# Patient Record
Sex: FEMALE | Race: WHITE | NOT HISPANIC OR LATINO | Employment: UNEMPLOYED | URBAN - METROPOLITAN AREA
[De-identification: names, ages, dates, MRNs, and addresses within clinical notes are randomized per-mention and may not be internally consistent; named-entity substitution may affect disease eponyms.]

---

## 2017-02-16 ENCOUNTER — LAB VISIT (OUTPATIENT)
Dept: LAB | Facility: OTHER | Age: 32
End: 2017-02-16
Attending: OBSTETRICS & GYNECOLOGY
Payer: MEDICAID

## 2017-02-16 ENCOUNTER — OFFICE VISIT (OUTPATIENT)
Dept: OBSTETRICS AND GYNECOLOGY | Facility: CLINIC | Age: 32
End: 2017-02-16
Attending: OBSTETRICS & GYNECOLOGY
Payer: MEDICAID

## 2017-02-16 VITALS
DIASTOLIC BLOOD PRESSURE: 74 MMHG | BODY MASS INDEX: 26.76 KG/M2 | WEIGHT: 141.75 LBS | HEIGHT: 61 IN | SYSTOLIC BLOOD PRESSURE: 112 MMHG

## 2017-02-16 DIAGNOSIS — Z20.2 POSSIBLE EXPOSURE TO STD: ICD-10-CM

## 2017-02-16 DIAGNOSIS — Z00.00 ANNUAL PHYSICAL EXAM: Primary | ICD-10-CM

## 2017-02-16 LAB
CANDIDA RRNA VAG QL PROBE: NEGATIVE
G VAGINALIS RRNA GENITAL QL PROBE: NEGATIVE
HAV IGM SERPL QL IA: NEGATIVE
HBV CORE IGM SERPL QL IA: NEGATIVE
HBV SURFACE AG SERPL QL IA: NEGATIVE
HCV AB SERPL QL IA: NEGATIVE
HIV 1+2 AB+HIV1 P24 AG SERPL QL IA: NEGATIVE
T VAGINALIS RRNA GENITAL QL PROBE: NEGATIVE

## 2017-02-16 PROCEDURE — 88175 CYTOPATH C/V AUTO FLUID REDO: CPT

## 2017-02-16 PROCEDURE — 99999 PR PBB SHADOW E&M-EST. PATIENT-LVL III: CPT | Mod: PBBFAC,,, | Performed by: OBSTETRICS & GYNECOLOGY

## 2017-02-16 PROCEDURE — 86592 SYPHILIS TEST NON-TREP QUAL: CPT

## 2017-02-16 PROCEDURE — 80074 ACUTE HEPATITIS PANEL: CPT

## 2017-02-16 PROCEDURE — 86703 HIV-1/HIV-2 1 RESULT ANTBDY: CPT

## 2017-02-16 PROCEDURE — 36415 COLL VENOUS BLD VENIPUNCTURE: CPT

## 2017-02-16 PROCEDURE — 99385 PREV VISIT NEW AGE 18-39: CPT | Mod: S$PBB,,, | Performed by: OBSTETRICS & GYNECOLOGY

## 2017-02-16 NOTE — PROGRESS NOTES
"CC: Well woman exam    Savannah Tijerina is a 31 y.o. female  s/p BTL presents for well woman exam.  LMP: Patient's last menstrual period was 2017..  No issues, problems, or complaints.  Last pap .  Hx abnormal pap in the past s/p colpo.    Past Medical History   Diagnosis Date    Abnormal Pap smear of cervix      Past Surgical History   Procedure Laterality Date    Tubal ligation      Appendectomy       Social History     Social History    Marital status: Single     Spouse name: N/A    Number of children: N/A    Years of education: N/A     Occupational History    Not on file.     Social History Main Topics    Smoking status: Never Smoker    Smokeless tobacco: Not on file    Alcohol use No    Drug use: No    Sexual activity: Yes     Partners: Male     Birth control/ protection: None     Other Topics Concern    Not on file     Social History Narrative    No narrative on file     Family History   Problem Relation Age of Onset    Breast cancer Neg Hx     Colon cancer Neg Hx     Ovarian cancer Neg Hx      OB History      Para Term  AB TAB SAB Ectopic Multiple Living    3 3 3      3           Visit Vitals    /74    Ht 5' 1" (1.549 m)    Wt 64.3 kg (141 lb 12.1 oz)    LMP 2017    BMI 26.78 kg/m2         ROS:  GENERAL: Denies weight gain or weight loss. Feeling well overall.   SKIN: Denies rash or lesions.   HEAD: Denies head injury or headache.   NODES: Denies enlarged lymph nodes.   CHEST: Denies chest pain or shortness of breath.   CARDIOVASCULAR: Denies palpitations or left sided chest pain.   ABDOMEN: No abdominal pain, constipation, diarrhea, nausea, vomiting or rectal bleeding.   URINARY: No frequency, dysuria, hematuria, or burning on urination.  REPRODUCTIVE: See HPI.   BREASTS: The patient performs breast self-examination and denies pain, lumps, or nipple discharge.   HEMATOLOGIC: No easy bruisability or excessive bleeding.   MUSCULOSKELETAL: " Denies joint pain or swelling.   NEUROLOGIC: Denies syncope or weakness.   PSYCHIATRIC: Denies depression, anxiety or mood swings.    PHYSICAL EXAM:  APPEARANCE: Well nourished, well developed, in no acute distress.  AFFECT: WNL, alert and oriented x 3  SKIN: No acne or hirsutism  NECK: Neck symmetric without masses or thyromegaly  NODES: No inguinal, cervical, axillary, or femoral lymph node enlargement  CHEST: Good respiratory effect  ABDOMEN: Soft.  No tenderness or masses.  No hepatosplenomegaly.  No hernias.  BREASTS: Symmetrical, no skin changes or visible lesions.  No palpable masses, nipple discharge bilaterally.  PELVIC: Normal external genitalia without lesions.  Normal hair distribution.  Adequate perineal body, normal urethral meatus.  Vagina moist and well rugated without lesions or discharge.  Cervix pink, without lesions, discharge or tenderness.  No significant cystocele or rectocele.  Bimanual exam shows uterus to be normal size, regular, mobile and nontender.  Adnexa without masses or tenderness.    EXTREMITIES: No edema.    ASSESSMENT    ICD-10-CM ICD-9-CM    1. Annual physical exam Z00.00 V70.0 Liquid-based pap smear, screening      HPV DNA probe, amplified   2. Possible exposure to STD Z20.2 V01.6 C. trachomatis/N. gonorrhoeae by AMP DNA Cervicovaginal      Hepatitis panel, acute      RPR      HIV-1 and HIV-2 antibodies      Vaginosis Screen by DNA Probe         PLAN:  Annual physical exam  -     Liquid-based pap smear, screening  -     HPV DNA probe, amplified    Possible exposure to STD  -     C. trachomatis/N. gonorrhoeae by AMP DNA Cervicovaginal  -     Hepatitis panel, acute; Future; Expected date: 2/16/17  -     RPR; Future; Expected date: 2/16/17  -     HIV-1 and HIV-2 antibodies; Future; Expected date: 2/16/17  -     Vaginosis Screen by DNA Probe        Patient was counseled today on A.C.S. Pap guidelines and recommendations for yearly pelvic exams, mammograms and monthly self breast  exams; to see her PCP for other health maintenance.

## 2017-02-16 NOTE — MR AVS SNAPSHOT
"    [facility] [address]  [address]  [address]  [facility] [address]  Phone: [phone]  Fax: [phone]                  Savannah Tijerina   2017 8:15 AM   Office Visit    Description:  Female : 1985   Provider:  Layla Roque MD   Department:  [facility] [address]           Reason for Visit     Well Woman     STD CHECK                To Do List           Goals (5 Years of Data)     None      [facility]     [facility] On Call Nurse Care Line -  Assistance  Registered nurses in the [facility] provide clinical advisement, health education, appointment booking, and other advisory services.  Call for this free service at [phone].             Medications           Message regarding Medications     Verify the changes and/or additions to your medication regime listed below are the same as discussed with your clinician today.  If any of these changes or additions are incorrect, please notify your healthcare provider.             Verify that the below list of medications is an accurate representation of the medications you are currently taking.  If none reported, the list may be blank. If incorrect, please contact your healthcare provider. Carry this list with you in case of emergency.                Clinical Reference Information           Your Vitals Were     BP Height Weight Last Period BMI    112/74 5' 1" (1.549 m) 64.3 kg (141 lb 12.1 oz) 2017 26.78 kg/m2      Blood Pressure          Most Recent Value    BP  112/74      Allergies as of 2017     Amoxicillin    Penicillins      Immunizations Administered on Date of Encounter - 2017     None      [facility] Sign-Up     Activating your [facility] account is as easy as 1-2-3!     1) Visit [email or web address], select Sign Up Now, enter this activation code and your date of birth, then select Next.  [email or web address]  Expires: 2017  8:24 AM      2) Create a username and password to use when you " visit [facility] in the future and select a security question in case you lose your password and select Next.    3) Enter your e-mail address and click Sign Up!    Additional Information  If you have questions, please e-mail [email or web address] or call [phone] to talk to our [facility] staff. Remember, [facility] is NOT to be used for urgent needs. For medical emergencies, dial 911.         Language Assistance Services     ATTENTION: Language assistance services are available, free of charge. Please call [phone].      ATENCIÓN: Si habla español, tiene a calderon disposición servicios gratuitos de asistencia lingüística. Llame al [phone].     CHÚ Ý: N?u b?n nói Ti?ng Vi?t, có các d?ch v? h? tr? ngôn ng? mi?n phí dành cho b?n. G?i s? [phone].         [facility] [address] complies with applicable Federal civil rights laws and does not discriminate on the basis of race, color, national origin, age, disability, or sex.

## 2017-02-17 LAB
C TRACH DNA SPEC QL NAA+PROBE: NEGATIVE
N GONORRHOEA DNA SPEC QL NAA+PROBE: NEGATIVE
RPR SER QL: NORMAL

## 2017-02-22 LAB — HUMAN PAPILLOMAVIRUS (HPV): NOT DETECTED

## 2018-03-11 ENCOUNTER — OFFICE VISIT (OUTPATIENT)
Dept: URGENT CARE | Facility: CLINIC | Age: 33
End: 2018-03-11
Payer: MEDICAID

## 2018-03-11 VITALS
HEART RATE: 97 BPM | TEMPERATURE: 99 F | WEIGHT: 171 LBS | SYSTOLIC BLOOD PRESSURE: 156 MMHG | HEIGHT: 61 IN | DIASTOLIC BLOOD PRESSURE: 95 MMHG | OXYGEN SATURATION: 100 % | BODY MASS INDEX: 32.28 KG/M2

## 2018-03-11 DIAGNOSIS — H53.8 BLURRED VISION, RIGHT EYE: Primary | ICD-10-CM

## 2018-03-11 PROCEDURE — 99214 OFFICE O/P EST MOD 30 MIN: CPT | Mod: 25,S$GLB,, | Performed by: SURGERY

## 2018-03-11 NOTE — PATIENT INSTRUCTIONS
Blurred Vision  Blurred vision is the loss of sharpness of vision and the inability to see small details. Any changes in your vision, whether sudden or gradual, should be checked out by an eye specialist.  Vision changes can be caused by many different things. These include eye diseases, side effects of some medicines, or a condition like diabetes. Vision changes should never be ignored. It is common to assume that vision changes are due to needing more powerful vision correction. Making this assumption, many people postpone seeing their healthcare provider about their vision changes. Delaying care is risky, however. Some eye problems, if left untreated, can lead to permanent vision loss that is not correctable with glasses. This can significantly reduce quality of life.  Home care  · Make changes in your home to reduce the risk of falling.  ¨ Keep walkways clear of objects you may trip over. Use nonslip pads under rugs.  ¨ Do not walk in poorly lit areas.  ¨ Be cautious when stepping up and down from curbs and walking on uneven sidewalks.  · Brighter lighting in your home may help you see better.  Follow-up care  Follow up with an eye specialist or as advised. There are two types of eye doctor you can consult:  · An optometrist is a licensed doctor of optometry. Optometrists are not medical doctors. Optometrists specialize in eye exams and may diagnose some eye problems. They also prescribe glasses and contact lenses.  · An ophthalmologist is a medical doctor who specializes in eye care. Ophthalmologists diagnose and treat all eye diseases, prescribe medicines, and perform eye surgery. They may also prescribe glasses and contact lenses.  An optometrist can provide a basic screening eye exam for much less cost than an ophthalmologist. The optometrist can tell you if your condition needs the services of an ophthalmologist.  When to seek medical advice  Call your healthcare provider right away if any of these  occur.  · Sudden change in your vision  · Eye pain, redness, or discharge from your eyelid  · No improvement or worsening of blurriness  · Dark spots in your field of vision  · Halos around lights  · Floaters (dots or strings moving across your field of vision)  · Sudden flash of light inside your eye  · Dimness of vision  · Partial or complete loss of vision  Date Last Reviewed: 6/14/2015  © 6487-5681 [employer]. [address] [place] [address], [place] [id number]. All rights reserved. This information is not intended as a substitute for professional medical care. Always follow your healthcare professional's instructions.

## 2018-03-11 NOTE — PROGRESS NOTES
"Subjective:       Patient ID: Savannah Tijerina is a 32 y.o. female.    Vitals:  height is 5' 1" (1.549 m) and weight is 77.6 kg (171 lb). Her temperature is 98.6 °F (37 °C). Her blood pressure is 156/95 (abnormal) and her pulse is 97. Her oxygen saturation is 100%.     Chief Complaint: Eye Problem    Pt states that when she looks through her right eye it seems foggy, like if she has a white film on it.       Eye Problem    The right eye is affected. This is a new problem. The current episode started today. The problem occurs constantly. The problem has been unchanged. There was no injury mechanism. The pain is mild. She does not wear contacts. Pertinent negatives include no blurred vision, eye redness, fever, nausea, photophobia or vomiting. She has tried eye drops (visine totality) for the symptoms. The treatment provided no relief (pt states that it made it worse).     Review of Systems   Constitution: Negative for chills and fever.   HENT: Negative for congestion.    Eyes: Negative for blurred vision, pain, photophobia and redness.   Gastrointestinal: Negative for nausea and vomiting.   Neurological: Negative for headaches.   All other systems reviewed and are negative.      Objective:      Physical Exam   Eyes: Conjunctivae, EOM and lids are normal. Pupils are equal, round, and reactive to light.   Right eye anesthetized with procaine eye drop, flusorecin/woods lamp exam showed no cornea abrasion, no dullness, no edema on exam. No foreign body. EYE irrigated with saline.        Assessment:       1. Blurred vision, right eye        Plan:         Blurred vision, right eye    Will go home and rest. If blurring persists, vision worsens, eye pain develops will go to [facility]. Follow up with opthalmology short term if not resolved with rest.     Patient Instructions     Blurred Vision  Blurred vision is the loss of sharpness of vision and the inability to see small details. Any changes in your vision, whether sudden or " gradual, should be checked out by an eye specialist.  Vision changes can be caused by many different things. These include eye diseases, side effects of some medicines, or a condition like diabetes. Vision changes should never be ignored. It is common to assume that vision changes are due to needing more powerful vision correction. Making this assumption, many people postpone seeing their healthcare provider about their vision changes. Delaying care is risky, however. Some eye problems, if left untreated, can lead to permanent vision loss that is not correctable with glasses. This can significantly reduce quality of life.  Home care  · Make changes in your home to reduce the risk of falling.  ¨ Keep walkways clear of objects you may trip over. Use nonslip pads under rugs.  ¨ Do not walk in poorly lit areas.  ¨ Be cautious when stepping up and down from curbs and walking on uneven sidewalks.  · Brighter lighting in your home may help you see better.  Follow-up care  Follow up with an eye specialist or as advised. There are two types of eye doctor you can consult:  · An optometrist is a licensed doctor of optometry. Optometrists are not medical doctors. Optometrists specialize in eye exams and may diagnose some eye problems. They also prescribe glasses and contact lenses.  · An ophthalmologist is a medical doctor who specializes in eye care. Ophthalmologists diagnose and treat all eye diseases, prescribe medicines, and perform eye surgery. They may also prescribe glasses and contact lenses.  An optometrist can provide a basic screening eye exam for much less cost than an ophthalmologist. The optometrist can tell you if your condition needs the services of an ophthalmologist.  When to seek medical advice  Call your healthcare provider right away if any of these occur.  · Sudden change in your vision  · Eye pain, redness, or discharge from your eyelid  · No improvement or worsening of blurriness  · Dark spots in your  field of vision  · Halos around lights  · Floaters (dots or strings moving across your field of vision)  · Sudden flash of light inside your eye  · Dimness of vision  · Partial or complete loss of vision  Date Last Reviewed: 6/14/2015  © 4854-3703 [employer]. [address] [place] [address]. All rights reserved. This information is not intended as a substitute for professional medical care. Always follow your healthcare professional's instructions.

## 2018-05-04 ENCOUNTER — OFFICE VISIT (OUTPATIENT)
Dept: URGENT CARE | Facility: CLINIC | Age: 33
End: 2018-05-04
Payer: MEDICAID

## 2018-05-04 VITALS
HEART RATE: 87 BPM | SYSTOLIC BLOOD PRESSURE: 133 MMHG | RESPIRATION RATE: 18 BRPM | DIASTOLIC BLOOD PRESSURE: 95 MMHG | TEMPERATURE: 98 F | HEIGHT: 61 IN | OXYGEN SATURATION: 97 % | BODY MASS INDEX: 33.99 KG/M2 | WEIGHT: 180 LBS

## 2018-05-04 DIAGNOSIS — L23.9 ALLERGIC CONTACT DERMATITIS, UNSPECIFIED TRIGGER: Primary | ICD-10-CM

## 2018-05-04 PROCEDURE — 99203 OFFICE O/P NEW LOW 30 MIN: CPT | Mod: S$GLB,,, | Performed by: NURSE PRACTITIONER

## 2018-05-04 RX ORDER — DEXAMETHASONE SODIUM PHOSPHATE 100 MG/10ML
7 INJECTION INTRAMUSCULAR; INTRAVENOUS
Status: COMPLETED | OUTPATIENT
Start: 2018-05-04 | End: 2018-05-04

## 2018-05-04 RX ORDER — TRIAMCINOLONE ACETONIDE 1 MG/G
CREAM TOPICAL 2 TIMES DAILY
Qty: 15 G | Refills: 0 | Status: SHIPPED | OUTPATIENT
Start: 2018-05-04 | End: 2018-07-22

## 2018-05-04 RX ADMIN — DEXAMETHASONE SODIUM PHOSPHATE 7 MG: 100 INJECTION INTRAMUSCULAR; INTRAVENOUS at 10:05

## 2018-05-04 NOTE — PROGRESS NOTES
"Subjective:       Patient ID: Savannah Tijerina is a 32 y.o. female.    Vitals:  height is 5' 1" (1.549 m) and weight is 81.6 kg (180 lb). Her oral temperature is 98.4 °F (36.9 °C). Her blood pressure is 133/95 (abnormal) and her pulse is 87. Her respiration is 18 and oxygen saturation is 97%.     Chief Complaint: Urticaria    Urticaria   This is a new problem. The current episode started in the past 7 days. The problem has been gradually worsening since onset. The affected locations include the left arm and right upper leg. The rash is characterized by redness and itchiness. It is unknown if there was an exposure to a precipitant. Pertinent negatives include no fever, joint pain, shortness of breath or sore throat. Past treatments include antihistamine. The treatment provided mild relief.     Review of Systems   Constitution: Negative for chills and fever.   HENT: Negative for sore throat.    Respiratory: Negative for shortness of breath.    Skin: Positive for color change, itching and rash.   Musculoskeletal: Negative for joint pain.       Objective:      Physical Exam   Constitutional: She is oriented to person, place, and time. She appears well-developed and well-nourished. She is cooperative.  Non-toxic appearance. She does not have a sickly appearance. She does not appear ill. No distress.   HENT:   Head: Normocephalic and atraumatic.   Right Ear: External ear normal.   Left Ear: External ear normal.   Nose: Nose normal.   Cardiovascular: Normal rate, regular rhythm and normal heart sounds.    Pulmonary/Chest: Effort normal and breath sounds normal. No accessory muscle usage. No apnea, no tachypnea and no bradypnea. No respiratory distress. She has no decreased breath sounds. She has no wheezes. She has no rhonchi. She has no rales.   Musculoskeletal: Normal range of motion.   Neurological: She is alert and oriented to person, place, and time. Gait normal.   Skin: Skin is warm. Capillary refill takes less " than 2 seconds. Rash noted. She is not diaphoretic. There is erythema.        Pruritic, erythematous, maculopapular rash only on exposed surfaces of skin   Psychiatric: She has a normal mood and affect. Her speech is normal and behavior is normal.   Nursing note and vitals reviewed.      Assessment:       1. Allergic contact dermatitis, unspecified trigger        Plan:         Allergic contact dermatitis, unspecified trigger  -     dexamethasone injection 7 mg; Inject 0.7 mLs (7 mg total) into the muscle one time.  -     triamcinolone acetonide 0.1% (KENALOG) 0.1 % cream; Apply topically 2 (two) times daily.  Dispense: 15 g; Refill: 0  -     Ambulatory referral to Allergy    Instructed pt to follow up with allergist because she states she has gotten a rash like this before and does not know what she could be coming into contact with.

## 2018-05-04 NOTE — PATIENT INSTRUCTIONS
Please follow up with your primary care provider if you are not feeling better in 7-10 days.    Please drink plenty of fluids.  Please get plenty of rest.    Please return here or go to the Emergency Department for any concerns or worsening of condition.    Please take over the counter Benadryl as directed for the next 24-72 hours as needed for itching unless it makes you sleepy, then you can take over the counter Allegra or Claritin or Zyrtec as directed during the daytime hours as these generally do not cause drowsiness.    Please follow up with your primary care doctor or specialist as needed.    If you  smoke, please stop smoking.  Self-Care for Skin Rashes     Pat your skin dry. Do not rub.     When your skin reacts to a substance your body is sensitive to, it can cause a rash. You can treat most rashes at home by keeping the skin clean and dry. Many rashes may get better on their own within 2 to 3 days. You may need medical attention if your rash itches, drains, or hurts, particularly if the rash is getting worse.  What can cause a skin rash?  · Sun poisoning, caused by too much exposure to the sun  · An irritant or allergic reaction to a certain type of food, plant, or chemical, such as  shellfish, poison ivy, and or cleaning products  · An infection caused by a fungus (ringworm), virus (chickenpox), or bacteria (strep)  · Bites or infestation caused by insects or pests, such as ticks, lice, or mites  · Dry skin, which is often seen during the winter months and in older people  How can I control itching and skin damage?  · Take soothing lukewarm baths in a colloidal oatmeal product. You can buy this at the [employer].  · Do your best not to scratch. Clip fingernails short, especially in young children, to reduce skin damage if scratching does occur.  · Use moisturizing skin lotion instead of scratching your dry skin.  · Use sunscreen whenever going out into direct sun.  · Use only mild cleansing agents  whenever possible.  · Wash with mild, nonirritating soap and warm water.  · Wear clothing that breathes, such as cotton shirts or canvas shoes.  · If fluid is seeping from the rash, cover it loosely with clean gauze to absorb the discharge.  · Many rashes are contagious. Prevent the rash from spreading to others by washing your hands often before or after touching others with any skin rash.  Use medicine  · Antihistamines such as diphenhydramine can help control itching. But use with caution because they can make you drowsy.  · Using over-the-counter hydrocortisone cream on small rashes may help reduce swelling and itching  · Most over-the-counter antifungal medicines can treat athletes foot and many other fungal infections of the skin.  Check with your healthcare provider  Call your healthcare provider if:  · You were told that you have a fungal infection on your skin to make sure you have the correct type of medicine.  · You have questions or concerns about medicines or their side effects.     Call 911  Call 911 if either of these occur:  · Your tongue or lips start to swell  · You have difficulty breathing      Call your healthcare provider  Call your healthcare provider if any of these occur:  · Temperature of more than 101.0°F (38.3°C), or as directed  · Sore throat, a cough, or unusual fatigue  · Red, oozy, or painful rash gets worse. These are signs of infection.  · Rash covers your face, genitals, or most of your body  · Crusty sores or red rings that begin to spread  · You were exposed to someone who has a contagious rash, such as scabies or lice.  · Red bulls-eye rash with a white center (a sign of Lyme disease)  · You were told that you have resistant bacteria (MRSA) on your skin.   Date Last Reviewed: 5/12/2015  © 8473-8148 [employer]. [address] [place] [address], [place] [address]. All rights reserved. This information is not intended as a substitute for professional medical care. Always  "follow your healthcare professional's instructions.        Contact Dermatitis  Contact dermatitis is a skin rash caused by something that touches the skin and makes it irritated and inflamed. Your skin may be red, swollen, dry, and may be cracked. Blisters may form and ooze. The rash will itch.  Contact dermatitis can form on the face and neck, backs of hands, forearms, genitals, and lower legs.  People can get contact dermatitis from lots of sources. These include:  · Plants such as poison ivy, oak, or sumac  · Chemicals in hair dyes and rinses, soaps, solvents, waxes, fingernail polish, and deodorants   · Jewelry or watchbands made of nickel  Contact dermatitis is not passed from person to person.  Talk with your healthcare provider about what may have caused the rash. A type of allergy testing called "patch testing" may be used to discover what you are allergic to. You will need to avoid the source of your rash in the future to prevent it from coming back.  Treatment is done to relieve itching and prevent the rash from coming back. The rash should go away in a few days to a few weeks.  Home care  Your healthcare provider may prescribe medicine to relieve swelling and itching. Follow all instructions when using these medicines.  General care:  · Avoid anything that heats up your skin, such as hot showers or baths, or direct sunlight. This can make itching worse.  · Apply cold compresses to soothe your sores to help relieve your symptoms. Do this for 30 minutes 3 to 4 times a day. You can make a cold compress by soaking a cloth in cold water. Squeeze out excess water. You can add colloidal oatmeal to the water to help reduce itching. For severe itching in a small area, apply an ice pack wrapped in a thin towel. Do this for 20 minutes 3 to 4 times a day.  · You can also try wet dressings. One way to do this is to wear a wet piece of clothing under a dry one. Wear a damp shirt under a dry shirt if your upper body is " affected. This can relieve itching and prevent you from scratching the affected area.  · You can also help relieve large areas of itching by taking a lukewarm bath with colloidal oatmeal added to the water.  · Use hydrocortisone cream for redness and irritation, unless another medicine was prescribed. You can also use benzocaine anesthetic cream or spray. Calamine lotion can also relieve mild symptoms.  · Use oral diphenhydramine to help reduce itching. You can buy this antihistamine at drug and grocery stores. It can make you sleepy, so use lower doses during the daytime. Or you can use loratadine. This is an antihistamine that will not make you sleepy. Do not use diphenhydramine if you have glaucoma or have trouble urinating due to an enlarged prostate.  · If a plant causes your rash, make sure to wash your skin and the clothes you were wearing when you came into contact with the plant. This is to wash away the plant oils that gave you the rash and prevent more or worse symptoms.  · Stay away from the substance or object that causes your symptoms. If you cant avoid it, wear gloves or some other type of protection.  Follow-up care  Follow up with your healthcare provider, or as advised.  When to seek medical advice  Call your healthcare provider right away if any of these occur:  · Spreading of the rash to other parts of your body  · Severe swelling of your face, eyelids, mouth, throat or tongue  · Trouble urinating due to swelling in the genital area  · Fever of 100.4°F (38°C) or higher  · Redness or swelling that gets worse  · Pain that gets worse  · Foul-smelling fluid leaking from the skin  · Yellow-brown crusts on the open blisters  Date Last Reviewed: 9/1/2016  © 8500-0587 [employer]. [address] [place] [address], [place] [address]. All rights reserved. This information is not intended as a substitute for professional medical care. Always follow your healthcare professional's instructions.

## 2018-07-22 ENCOUNTER — HOSPITAL ENCOUNTER (EMERGENCY)
Facility: HOSPITAL | Age: 33
Discharge: HOME | End: 2018-07-22
Attending: EMERGENCY MEDICINE
Payer: MEDICAID

## 2018-07-22 VITALS
TEMPERATURE: 98 F | RESPIRATION RATE: 18 BRPM | HEIGHT: 61 IN | HEART RATE: 89 BPM | SYSTOLIC BLOOD PRESSURE: 135 MMHG | WEIGHT: 170 LBS | DIASTOLIC BLOOD PRESSURE: 79 MMHG | BODY MASS INDEX: 32.1 KG/M2 | OXYGEN SATURATION: 98 %

## 2018-07-22 DIAGNOSIS — R10.13 EPIGASTRIC PAIN: Primary | ICD-10-CM

## 2018-07-22 LAB
ALBUMIN SERPL BCP-MCNC: 3.7 G/DL
ALP SERPL-CCNC: 42 U/L
ALT SERPL W/O P-5'-P-CCNC: 33 U/L
AMORPH CRY URNS QL MICRO: ABNORMAL
ANION GAP SERPL CALC-SCNC: 8 MMOL/L
AST SERPL-CCNC: 22 U/L
B-HCG UR QL: NEGATIVE
BASOPHILS # BLD AUTO: 0.02 K/UL
BASOPHILS NFR BLD: 0.3 %
BILIRUB SERPL-MCNC: 0.3 MG/DL
BILIRUB UR QL STRIP: NEGATIVE
BUN SERPL-MCNC: 12 MG/DL
CALCIUM SERPL-MCNC: 8.8 MG/DL
CHLORIDE SERPL-SCNC: 107 MMOL/L
CLARITY UR: ABNORMAL
CO2 SERPL-SCNC: 25 MMOL/L
COLOR UR: YELLOW
CREAT SERPL-MCNC: 0.7 MG/DL
CTP QC/QA: YES
DIFFERENTIAL METHOD: ABNORMAL
EOSINOPHIL # BLD AUTO: 0.2 K/UL
EOSINOPHIL NFR BLD: 2.4 %
ERYTHROCYTE [DISTWIDTH] IN BLOOD BY AUTOMATED COUNT: 12.3 %
EST. GFR  (AFRICAN AMERICAN): >60 ML/MIN/1.73 M^2
EST. GFR  (NON AFRICAN AMERICAN): >60 ML/MIN/1.73 M^2
GLUCOSE SERPL-MCNC: 104 MG/DL
GLUCOSE UR QL STRIP: NEGATIVE
HCT VFR BLD AUTO: 37.2 %
HGB BLD-MCNC: 13.1 G/DL
HGB UR QL STRIP: NEGATIVE
KETONES UR QL STRIP: NEGATIVE
LEUKOCYTE ESTERASE UR QL STRIP: NEGATIVE
LIPASE SERPL-CCNC: 13 U/L
LYMPHOCYTES # BLD AUTO: 3 K/UL
LYMPHOCYTES NFR BLD: 49 %
MCH RBC QN AUTO: 28.4 PG
MCHC RBC AUTO-ENTMCNC: 35.2 G/DL
MCV RBC AUTO: 81 FL
MICROSCOPIC COMMENT: ABNORMAL
MONOCYTES # BLD AUTO: 0.8 K/UL
MONOCYTES NFR BLD: 12.5 %
NEUTROPHILS # BLD AUTO: 2.2 K/UL
NEUTROPHILS NFR BLD: 35.8 %
NITRITE UR QL STRIP: NEGATIVE
PH UR STRIP: 7 [PH] (ref 5–8)
PLATELET # BLD AUTO: 274 K/UL
PMV BLD AUTO: 10.3 FL
POTASSIUM SERPL-SCNC: 3.9 MMOL/L
PROT SERPL-MCNC: 6.9 G/DL
PROT UR QL STRIP: NEGATIVE
RBC # BLD AUTO: 4.61 M/UL
RBC #/AREA URNS HPF: 0 /HPF (ref 0–4)
SODIUM SERPL-SCNC: 140 MMOL/L
SP GR UR STRIP: 1.02 (ref 1–1.03)
SQUAMOUS #/AREA URNS HPF: ABNORMAL /HPF
URN SPEC COLLECT METH UR: ABNORMAL
UROBILINOGEN UR STRIP-ACNC: NEGATIVE EU/DL
WBC # BLD AUTO: 6.18 K/UL
WBC #/AREA URNS HPF: 0 /HPF (ref 0–5)

## 2018-07-22 PROCEDURE — 96361 HYDRATE IV INFUSION ADD-ON: CPT

## 2018-07-22 PROCEDURE — 80053 COMPREHEN METABOLIC PANEL: CPT

## 2018-07-22 PROCEDURE — 96374 THER/PROPH/DIAG INJ IV PUSH: CPT

## 2018-07-22 PROCEDURE — 63600175 PHARM REV CODE 636 W HCPCS: Performed by: NURSE PRACTITIONER

## 2018-07-22 PROCEDURE — 25000003 PHARM REV CODE 250: Performed by: NURSE PRACTITIONER

## 2018-07-22 PROCEDURE — S0028 INJECTION, FAMOTIDINE, 20 MG: HCPCS | Performed by: NURSE PRACTITIONER

## 2018-07-22 PROCEDURE — 85025 COMPLETE CBC W/AUTO DIFF WBC: CPT

## 2018-07-22 PROCEDURE — 96375 TX/PRO/DX INJ NEW DRUG ADDON: CPT

## 2018-07-22 PROCEDURE — 83690 ASSAY OF LIPASE: CPT

## 2018-07-22 PROCEDURE — 99284 EMERGENCY DEPT VISIT MOD MDM: CPT | Mod: 25

## 2018-07-22 PROCEDURE — 81025 URINE PREGNANCY TEST: CPT | Performed by: NURSE PRACTITIONER

## 2018-07-22 PROCEDURE — 81000 URINALYSIS NONAUTO W/SCOPE: CPT

## 2018-07-22 RX ORDER — ONDANSETRON 4 MG/1
8 TABLET, ORALLY DISINTEGRATING ORAL EVERY 8 HOURS PRN
Qty: 15 TABLET | Refills: 0 | Status: SHIPPED | OUTPATIENT
Start: 2018-07-22 | End: 2023-11-13

## 2018-07-22 RX ORDER — FAMOTIDINE 10 MG/ML
20 INJECTION INTRAVENOUS
Status: COMPLETED | OUTPATIENT
Start: 2018-07-22 | End: 2018-07-22

## 2018-07-22 RX ORDER — ALUMINUM HYDROXIDE, MAGNESIUM HYDROXIDE, AND SIMETHICONE 2400; 240; 2400 MG/30ML; MG/30ML; MG/30ML
5 SUSPENSION ORAL EVERY 6 HOURS PRN
Qty: 335 ML | Refills: 0 | Status: SHIPPED | OUTPATIENT
Start: 2018-07-22 | End: 2023-11-13

## 2018-07-22 RX ORDER — FAMOTIDINE 20 MG/1
20 TABLET, FILM COATED ORAL 2 TIMES DAILY
Qty: 20 TABLET | Refills: 0 | Status: SHIPPED | OUTPATIENT
Start: 2018-07-22 | End: 2023-11-13

## 2018-07-22 RX ORDER — ONDANSETRON 2 MG/ML
4 INJECTION INTRAMUSCULAR; INTRAVENOUS
Status: COMPLETED | OUTPATIENT
Start: 2018-07-22 | End: 2018-07-22

## 2018-07-22 RX ADMIN — SODIUM CHLORIDE 1000 ML: 0.9 INJECTION, SOLUTION INTRAVENOUS at 08:07

## 2018-07-22 RX ADMIN — LIDOCAINE HYDROCHLORIDE: 20 SOLUTION ORAL; TOPICAL at 10:07

## 2018-07-22 RX ADMIN — ONDANSETRON 4 MG: 2 INJECTION INTRAMUSCULAR; INTRAVENOUS at 08:07

## 2018-07-22 RX ADMIN — FAMOTIDINE 20 MG: 10 INJECTION INTRAVENOUS at 08:07

## 2018-07-23 NOTE — DISCHARGE INSTRUCTIONS
Please return to the ED for any new or worsening symptoms:  Severe abdominal pain, persistent vomiting, chest pain, shortness of breath, loss of consciousness or any other concerns. Please follow up with primary care within in the week. You may also call [phone] for the [facility] same day appointment line.

## 2019-02-10 ENCOUNTER — HOSPITAL ENCOUNTER (EMERGENCY)
Facility: HOSPITAL | Age: 34
Discharge: HOME | End: 2019-02-11
Attending: INTERNAL MEDICINE
Payer: MEDICAID

## 2019-02-10 DIAGNOSIS — R15.9 ENCOPRESIS WITH CONSTIPATION AND OVERFLOW INCONTINENCE: Primary | ICD-10-CM

## 2019-02-10 LAB
B-HCG UR QL: NEGATIVE
BILIRUBIN, POC UA: ABNORMAL
BLOOD, POC UA: NEGATIVE
CLARITY, POC UA: CLEAR
COLOR, POC UA: YELLOW
CTP QC/QA: YES
GLUCOSE, POC UA: NEGATIVE
KETONES, POC UA: NEGATIVE
LEUKOCYTE EST, POC UA: NEGATIVE
NITRITE, POC UA: NEGATIVE
PH UR STRIP: 5 [PH]
PROTEIN, POC UA: NEGATIVE
SPECIFIC GRAVITY, POC UA: >=1.03
UROBILINOGEN, POC UA: 0.2 E.U./DL

## 2019-02-10 PROCEDURE — 87427 SHIGA-LIKE TOXIN AG IA: CPT

## 2019-02-10 PROCEDURE — 87449 NOS EACH ORGANISM AG IA: CPT

## 2019-02-10 PROCEDURE — 85025 COMPLETE CBC W/AUTO DIFF WBC: CPT | Mod: ER

## 2019-02-10 PROCEDURE — 87186 SC STD MICRODIL/AGAR DIL: CPT

## 2019-02-10 PROCEDURE — 99284 EMERGENCY DEPT VISIT MOD MDM: CPT | Mod: 25,ER

## 2019-02-10 PROCEDURE — 80053 COMPREHEN METABOLIC PANEL: CPT | Mod: ER

## 2019-02-10 PROCEDURE — 96360 HYDRATION IV INFUSION INIT: CPT | Mod: ER

## 2019-02-10 PROCEDURE — 87077 CULTURE AEROBIC IDENTIFY: CPT

## 2019-02-10 PROCEDURE — 87045 FECES CULTURE AEROBIC BACT: CPT

## 2019-02-10 PROCEDURE — 81025 URINE PREGNANCY TEST: CPT | Mod: ER | Performed by: INTERNAL MEDICINE

## 2019-02-10 PROCEDURE — 81003 URINALYSIS AUTO W/O SCOPE: CPT | Mod: ER

## 2019-02-10 PROCEDURE — 87046 STOOL CULTR AEROBIC BACT EA: CPT

## 2019-02-10 RX ORDER — SODIUM CHLORIDE 9 MG/ML
1000 INJECTION, SOLUTION INTRAVENOUS ONCE
Status: COMPLETED | OUTPATIENT
Start: 2019-02-11 | End: 2019-02-11

## 2019-02-11 VITALS
WEIGHT: 135 LBS | OXYGEN SATURATION: 97 % | RESPIRATION RATE: 18 BRPM | BODY MASS INDEX: 25.49 KG/M2 | HEART RATE: 104 BPM | HEIGHT: 61 IN | SYSTOLIC BLOOD PRESSURE: 124 MMHG | DIASTOLIC BLOOD PRESSURE: 57 MMHG | TEMPERATURE: 99 F

## 2019-02-11 PROBLEM — R15.9 ENCOPRESIS WITH CONSTIPATION AND OVERFLOW INCONTINENCE: Status: ACTIVE | Noted: 2019-02-11

## 2019-02-11 LAB
ALBUMIN SERPL-MCNC: 3.2 G/DL (ref 3.3–5.5)
ALP SERPL-CCNC: 49 U/L (ref 42–141)
BILIRUB SERPL-MCNC: 0.5 MG/DL (ref 0.2–1.6)
BUN SERPL-MCNC: 13 MG/DL (ref 7–22)
C DIFF GDH STL QL: POSITIVE
C DIFF TOX A+B STL QL IA: POSITIVE
CALCIUM SERPL-MCNC: 9.4 MG/DL (ref 8–10.3)
CHLORIDE SERPL-SCNC: 101 MMOL/L (ref 98–108)
CREAT SERPL-MCNC: 0.4 MG/DL (ref 0.6–1.2)
GLUCOSE SERPL-MCNC: 119 MG/DL (ref 73–118)
POC ALT (SGPT): 32 U/L (ref 10–47)
POC AST (SGOT): 30 U/L (ref 11–38)
POC TCO2: 28 MMOL/L (ref 18–33)
POTASSIUM BLD-SCNC: 3.9 MMOL/L (ref 3.6–5.1)
PROTEIN, POC: 5.9 G/DL (ref 6.4–8.1)
SODIUM BLD-SCNC: 142 MMOL/L (ref 128–145)

## 2019-02-11 PROCEDURE — 80053 COMPREHEN METABOLIC PANEL: CPT | Mod: ER

## 2019-02-11 PROCEDURE — 25000003 PHARM REV CODE 250: Mod: ER | Performed by: INTERNAL MEDICINE

## 2019-02-11 PROCEDURE — 85025 COMPLETE CBC W/AUTO DIFF WBC: CPT | Mod: ER

## 2019-02-11 RX ORDER — NITROFURANTOIN 25; 75 MG/1; MG/1
100 CAPSULE ORAL
Status: DISCONTINUED | OUTPATIENT
Start: 2019-02-11 | End: 2019-02-11

## 2019-02-11 RX ORDER — PSEUDOEPHEDRINE/ACETAMINOPHEN 30MG-500MG
100 TABLET ORAL
Status: COMPLETED | OUTPATIENT
Start: 2019-02-11 | End: 2019-02-11

## 2019-02-11 RX ORDER — SYRING-NEEDL,DISP,INSUL,0.3 ML 29 G X1/2"
296 SYRINGE, EMPTY DISPOSABLE MISCELLANEOUS
Status: COMPLETED | OUTPATIENT
Start: 2019-02-11 | End: 2019-02-11

## 2019-02-11 RX ADMIN — SODIUM CHLORIDE 500 ML: 0.9 INJECTION, SOLUTION INTRAVENOUS at 12:02

## 2019-02-11 RX ADMIN — Medication 296 ML: at 12:02

## 2019-02-11 RX ADMIN — SODIUM CHLORIDE 1000 ML: 0.9 INJECTION, SOLUTION INTRAVENOUS at 12:02

## 2019-02-11 RX ADMIN — Medication 100 ML: at 12:02

## 2019-02-11 NOTE — DISCHARGE INSTRUCTIONS
Encopresis    You have uncontrolled leakage of stool from the opening where stool leaves the body (anus). This is called encopresis. The leakage is caused by the backup of dry, hard stool (constipation). Hard stool piles up at the end of the rectum. This is where stool is stored before leaving through the anus. The lower colon and rectum may become stretched out. You may not even feel the need to have a bowel movement. In time, liquid stool leaks around the blockage and out through the anus. This leakage often happens without your child?s knowing it. Encopresis can be treated to stop this occurring.   What are the symptoms of encopresis?   Leakage of liquid stool onto the underwear  Stool leakage with the passing of gas  Pain around or below the belly button  No feeling of having to pass stool before leakage happens  Swelling or bloating of the belly (abdomen)  What causes encopresis?  Encopresis is caused by constipation. Some causes of constipation that may lead to encopresis include:  Holding back stool, due to prior painful bowel movement or another reason  Hirschsprung?s disease, a birth defect in which nerves in the large intestine (colon) are missing  An anus that is closer to the vagina or penis than normal (anteriorally placed anus)  How is encopresis diagnosed?     Liquid stool leaks around hard stool and out of your anus.   The healthcare provider will ask about your  symptoms. He or she will give you a physical exam.  You may have blood tests to check for other problems.  How is encopresis treated?  You may be prescribed a stool softener. These will help your child have normal bowel movements.  The healthcare provider may suggest changes in diet, such as adding more fiber. Fiber helps stool retain water.  You may need to drink more water and get regular exercise.   You may have bowel retraining. This process can help you have normal bowel movements.  You sit on the toilet for a short time after meals.  This helps the body reconnect eating with having bowel movements. Your healthcare provider will talk to you about the best way to start bowel retraining. Be patient. It can take 4 to 6 months or longer before encopresis goes away.  Date Last Reviewed: 10/1/2016  © 7089-3625 [employer] [address] [place] [address], [place] [address]. All rights reserved. This information is not intended as a substitute for professional medical care. Always follow your healthcare professional's instructions.

## 2019-02-11 NOTE — ED PROVIDER NOTES
Encounter Date: 2/10/2019    SCRIBE #1 NOTE: I, Shyla Urena, am scribing for, and in the presence of,  Dr. Trinh. I have scribed the following portions of the note - Other sections scribed: HPI, ROS, PE.       History     Chief Complaint   Patient presents with    Abdominal Pain     Pt reports abd discomfort with bloating and leakage of mucus/liquid from her rectum x 4 days. Pt reports she just finished a round of Cipro abx for her teeth.     33 y.o female presents with abdominal pain that feels like bloating for 4 days. She also notes nausea but no vomiting. She states every time she would go to the bathroom, only small amounts of mucus would come out. Her last normal BM was 4 days ago. She states she just finished a round of antibiotics and was taking narco. She denies fever, chills, or cold symptoms.       The history is provided by the patient.     Review of patient's allergies indicates:   Allergen Reactions    Amoxicillin Hives    Penicillins      Past Medical History:   Diagnosis Date    Abnormal Pap smear of cervix      Past Surgical History:   Procedure Laterality Date    APPENDECTOMY      TUBAL LIGATION       Family History   Problem Relation Age of Onset    Breast cancer Neg Hx     Colon cancer Neg Hx     Ovarian cancer Neg Hx      Social History     Tobacco Use    Smoking status: Current Every Day Smoker     Types: Cigarettes    Smokeless tobacco: Never Used   Substance Use Topics    Alcohol use: No    Drug use: No     Review of Systems   Constitutional: Negative for chills and fever.   HENT: Negative for congestion and sore throat.    Respiratory: Negative for cough.    Gastrointestinal: Positive for abdominal pain, constipation and nausea. Negative for vomiting.   All other systems reviewed and are negative.      Physical Exam     Initial Vitals [02/10/19 2323]   BP Pulse Resp Temp SpO2   (!) 140/89 (!) 114 18 98.7 °F (37.1 °C) 98 %      MAP       --         Physical Exam    Nursing note  and vitals reviewed.  Constitutional: She appears well-developed and well-nourished. She is not diaphoretic. No distress.   HENT:   Head: Normocephalic and atraumatic.   Eyes: EOM are normal.   Neck: Normal range of motion.   Cardiovascular: Normal rate, regular rhythm and normal heart sounds. Exam reveals no gallop and no friction rub.    No murmur heard.  Pulmonary/Chest: Breath sounds normal. No respiratory distress. She has no wheezes. She has no rhonchi. She has no rales.   Abdominal: Soft. There is no tenderness.   Musculoskeletal: Normal range of motion.   Neurological: She is alert and oriented to person, place, and time. No cranial nerve deficit or sensory deficit.   Skin: Skin is warm and dry. Capillary refill takes less than 2 seconds.   Psychiatric: She has a normal mood and affect. Her behavior is normal.         ED Course   Procedures  Labs Reviewed   POCT URINALYSIS W/O SCOPE - Abnormal; Notable for the following components:       Result Value    Glucose, UA Negative (*)     Bilirubin, UA 1+ (*)     Ketones, UA Negative (*)     Spec Grav UA >=1.030 (*)     Blood, UA Negative (*)     Protein, UA Negative (*)     Nitrite, UA Negative (*)     Leukocytes, UA Negative (*)     All other components within normal limits   POCT CMP - Abnormal; Notable for the following components:    Albumin, POC 3.2 (*)     POC Creatinine 0.4 (*)     POC Glucose 119 (*)     Protein 5.9 (*)     All other components within normal limits   CULTURE, STOOL   CLOSTRIDIUM DIFFICILE   ENTEROHEMORRHAGIC E.COLI   POCT URINE PREGNANCY   POCT URINALYSIS W/O SCOPE   POCT CBC   POCT CMP               Imaging Results          X-Ray Abdomen Flat And Erect (Final result)  Result time 02/11/19 00:17:25    Final result by Yulia Dwyer MD (02/11/19 00:17:25)                 Impression:      Nonspecific bowel gas pattern.  Moderately large stool.      Electronically signed by: Yulia Dwyer  Date:    02/11/2019  Time:    00:17              Narrative:    EXAMINATION:  XR ABDOMEN FLAT AND ERECT    CLINICAL HISTORY:  Abdominal Pain;    TECHNIQUE:  Flat and erect AP views of the abdomen were performed.    COMPARISON:  None    FINDINGS:  Abdomen flat and erect demonstrate a nonspecific bowel gas pattern.  Moderately large stool is seen.  There are no dilated loops of small bowel or significant air-fluid levels detected.  There is no free air seen under the diaphragm.  The visualized osseous structures are grossly unremarkable.  The lung bases are clear.                                 Medical Decision Making:   Initial Assessment:   33 y.o female presents with abdominal pain that feels like bloating for 4 days. She also notes nausea but no vomiting. She states every time she would go to the bathroom, only small amounts of mucus would come out. Her last normal BM was 4 days ago. She states she just finished a round of antibiotics and was taking narco. She denies fever, chills, or cold symptoms.   Clinical Tests:   Lab Tests: Ordered and Reviewed  ED Management:  X-ray of abdomen reveals moderate stool.  CMP was within limits except creatinine of 0.4.  CBC was within normal limits except for mild anemia.  Enema was given in the emergency department and the patient had 3 large bowel movements prior to discharge. She states she feels much better.  Instructions for encopresis were given and patient was advised to follow up with her primary care physician within the next 3 days for re-evaluation and return to the emergency department if condition worsens.            Scribe Attestation:   Scribe #1: I performed the above scribed service and the documentation accurately describes the services I performed. I attest to the accuracy of the note.    This document was produced by a scribe under my direction and in my presence. I agree with the content of the note and have made any necessary edits.     Dr. Trinh    02/11/2019 4:26 AM             Clinical Impression:      1. Encopresis with constipation and overflow incontinence          Disposition:   Disposition: Discharged  Condition: Stable                        Placido Trinh MD  02/11/19 0424

## 2019-02-12 LAB
E COLI SXT1 STL QL IA: NEGATIVE
E COLI SXT2 STL QL IA: NEGATIVE

## 2019-02-14 ENCOUNTER — TELEPHONE (OUTPATIENT)
Dept: EMERGENCY MEDICINE | Facility: HOSPITAL | Age: 34
End: 2019-02-14

## 2019-02-14 LAB — BACTERIA STL CULT: NORMAL

## 2019-02-21 NOTE — PROVIDER PROGRESS NOTES - EMERGENCY DEPT.
Encounter Date: 2/10/2019    ED Physician Progress Notes        Physician Note:   A certified letter sent out to patient.  Unable to contact patient regarding lab results.

## 2019-03-06 ENCOUNTER — TELEPHONE (OUTPATIENT)
Dept: EMERGENCY MEDICINE | Facility: HOSPITAL | Age: 34
End: 2019-03-06

## 2020-01-08 ENCOUNTER — TELEPHONE (OUTPATIENT)
Dept: ENDOCRINOLOGY | Facility: CLINIC | Age: 35
End: 2020-01-08

## 2020-03-25 ENCOUNTER — PATIENT MESSAGE (OUTPATIENT)
Dept: ENDOCRINOLOGY | Facility: CLINIC | Age: 35
End: 2020-03-25

## 2020-04-30 ENCOUNTER — PATIENT MESSAGE (OUTPATIENT)
Dept: ENDOCRINOLOGY | Facility: CLINIC | Age: 35
End: 2020-04-30

## 2020-04-30 ENCOUNTER — OFFICE VISIT (OUTPATIENT)
Dept: ENDOCRINOLOGY | Facility: CLINIC | Age: 35
End: 2020-04-30
Payer: MEDICAID

## 2020-04-30 DIAGNOSIS — E05.90 HYPERTHYROIDISM: ICD-10-CM

## 2020-04-30 PROCEDURE — 99204 PR OFFICE/OUTPT VISIT, NEW, LEVL IV, 45-59 MIN: ICD-10-PCS | Mod: 95,,, | Performed by: INTERNAL MEDICINE

## 2020-04-30 PROCEDURE — 99204 OFFICE O/P NEW MOD 45 MIN: CPT | Mod: 95,,, | Performed by: INTERNAL MEDICINE

## 2020-04-30 RX ORDER — ATENOLOL 100 MG/1
100 TABLET ORAL
COMMUNITY
Start: 2020-02-03 | End: 2023-11-13

## 2020-04-30 NOTE — PROGRESS NOTES
Subjective:      Patient ID: Savannah Tijerina is a 34 y.o. female.    Chief Complaint:  Hyperthyroidism    History of Present Illness  A 35 yo woman with diagnosis of hyperthyroidism, was referred by her PCP.   Medical conditions include mitral valve prolapse, HTN.    Was dx with HTN in Dec, 2019. Currently on Atenolol.    With regards to her hyperthyroidism:    Current symptoms:   Palpations +  weight loss +   frequent bm : no  Hair loss : no  Brittle nails +  tremor +  Anxiety +    Denies new eye symptoms: no    Denies enlargement of the neck, dysphagia, hoarse voice.     Menstrual cycles: regular; once every 28 days; lasts 5-7 days (heavier than usual)    FH: No thyroid disorders. No h/o cancer    SH: Smokes cig (half a pack per day); consumes alcohol occasionally          Review of Systems   Constitutional: Positive for unexpected weight change.   Eyes: Negative for visual disturbance.   Respiratory: Negative for shortness of breath.    Cardiovascular: Negative for chest pain.   Gastrointestinal: Negative for abdominal pain.   Genitourinary: Negative for urgency.   Musculoskeletal: Negative for arthralgias.   Skin: Negative for wound.   Neurological: Negative for headaches.   Hematological: Does not bruise/bleed easily.   Psychiatric/Behavioral: Negative for sleep disturbance.           BP Readings from Last 3 Encounters:   02/11/19 (!) 124/57   07/22/18 135/79   05/04/18 (!) 133/95     Wt Readings from Last 1 Encounters:   02/10/19 2323 61.2 kg (135 lb)         There is no height or weight on file to calculate BMI.    Lab Review:   No results found for: HGBA1C  No results found for: CHOL, HDL, LDLCALC, TRIG, CHOLHDL  Lab Results   Component Value Date     07/22/2018    K 3.9 07/22/2018     07/22/2018    CO2 25 07/22/2018     07/22/2018    BUN 12 07/22/2018    CREATININE 0.7 07/22/2018    CALCIUM 8.8 07/22/2018    PROT 6.9 07/22/2018    ALBUMIN 3.7 07/22/2018    BILITOT 0.3 07/22/2018     ALKPHOS 42 (L) 07/22/2018    AST 22 07/22/2018    ALT 33 07/22/2018    ANIONGAP 8 07/22/2018    ESTGFRAFRICA >60 07/22/2018    EGFRNONAA >60 07/22/2018         Assessment and Plan     Hyperthyroidism  Pt has a diagnosis of hyperthyroidism based on the labs from Dec, 2019 (in media): TSH: <0.005; FT4: 3.65    Differentials for hyperthyroidism include graves disease and toxic nodule.     -Check TrAB to determine etiology. If negative would consider uptake & scan.   -Repeat TFTs. Will determine dose of Methimazole based on the FT4 level.  -Continue Atenolol  -Check LFTs  -Discussed in detail about possible side effects of Methimazole, including agranulocytosis, aplastic anemia. Advised pt to inform us immediately if she has any symptoms of sore throat, or gingival bleeding.   -Encouraged to quit smoking.  -F/U in 6 months    The patient location is: home  The chief complaint leading to consultation is: hyperthyroidism  Visit type: audiovisual  Total time spent with patient: 30 min  Each patient to whom he or she provides medical services by telemedicine is:  (1) informed of the relationship between the physician and patient and the respective role of any other health care provider with respect to management of the patient; and (2) notified that he or she may decline to receive medical services by telemedicine and may withdraw from such care at any time.          Discussed with Dr. Munson

## 2020-04-30 NOTE — LETTER
April 30, 2020      [facility]  [address] [facility] [address]           [facility] - Endocrinology [address]th [place]  [address]  [facility] [address]  Phone: [phone]  Fax: [phone]          Patient: Savannah Tijerina   MR Number: [id number]   YOB: 1985   Date of Visit: 4/30/2020       Dear [facility]:    Thank you for referring Savannah Tijerina to me for evaluation. Attached you will find relevant portions of my assessment and plan of care.    If you have questions, please do not hesitate to call me. I look forward to following Savannah Tijerina along with you.    Sincerely,    Pratima Munson MD    Enclosure  CC:  No Recipients    If you would like to receive this communication electronically, please contact [email or web address] or [phone] to request more information on [employer] access.    For providers and/or their staff who would like to refer a patient to [facility], please contact us through our one-stop-shop provider referral line, [facility] , at [phone].    If you feel you have received this communication in error or would no longer like to receive these types of communications, please e-mail [email or web address]         
no

## 2020-04-30 NOTE — ASSESSMENT & PLAN NOTE
Pt has a diagnosis of hyperthyroidism based on the labs from Dec, 2019 (in media): TSH: <0.005; FT4: 3.65    Differentials for hyperthyroidism include graves disease and toxic nodule.     -Check TrAB to determine etiology. If negative would consider uptake & scan.   -Repeat TFTs. Will determine dose of Methimazole based on the FT4 level.  -Continue Atenolol  -Check LFTs  -Discussed in detail about possible side effects of Methimazole, including agranulocytosis, aplastic anemia. Advised pt to inform us immediately if she has any symptoms of sore throat, or gingival bleeding.   -Encouraged to quit smoking.  -F/U in 6 months

## 2020-04-30 NOTE — PATIENT INSTRUCTIONS
Please get lab work done tomorrow.   Will start Methimazole based on the lab results.     Side effects of Methimazole include agranulocytosis, aplastic anemia. Please inform us immediately if you notice any symptoms of sore throat, or gingival bleeding.     Follow up in 6 months

## 2020-05-01 ENCOUNTER — LAB VISIT (OUTPATIENT)
Dept: LAB | Facility: HOSPITAL | Age: 35
End: 2020-05-01
Attending: STUDENT IN AN ORGANIZED HEALTH CARE EDUCATION/TRAINING PROGRAM
Payer: MEDICAID

## 2020-05-01 DIAGNOSIS — E05.90 HYPERTHYROIDISM: ICD-10-CM

## 2020-05-01 LAB
ALBUMIN SERPL BCP-MCNC: 3.6 G/DL (ref 3.5–5.2)
ALP SERPL-CCNC: 66 U/L (ref 55–135)
ALT SERPL W/O P-5'-P-CCNC: 24 U/L (ref 10–44)
AST SERPL-CCNC: 23 U/L (ref 10–40)
BILIRUB DIRECT SERPL-MCNC: 0.2 MG/DL (ref 0.1–0.3)
BILIRUB SERPL-MCNC: 0.4 MG/DL (ref 0.1–1)
PROT SERPL-MCNC: 7 G/DL (ref 6–8.4)
T4 FREE SERPL-MCNC: 2.24 NG/DL (ref 0.71–1.51)
TSH SERPL DL<=0.005 MIU/L-ACNC: <0.01 UIU/ML (ref 0.4–4)

## 2020-05-01 PROCEDURE — 83520 IMMUNOASSAY QUANT NOS NONAB: CPT

## 2020-05-01 PROCEDURE — 80076 HEPATIC FUNCTION PANEL: CPT

## 2020-05-01 PROCEDURE — 84439 ASSAY OF FREE THYROXINE: CPT

## 2020-05-01 PROCEDURE — 84443 ASSAY THYROID STIM HORMONE: CPT

## 2020-05-01 PROCEDURE — 36415 COLL VENOUS BLD VENIPUNCTURE: CPT | Mod: PO

## 2020-05-04 ENCOUNTER — PATIENT MESSAGE (OUTPATIENT)
Dept: ENDOCRINOLOGY | Facility: CLINIC | Age: 35
End: 2020-05-04

## 2020-05-04 DIAGNOSIS — E05.90 HYPERTHYROIDISM: Primary | ICD-10-CM

## 2020-05-04 LAB — TSH RECEP AB SER-ACNC: 2.72 IU/L (ref 0–1.75)

## 2020-05-04 RX ORDER — METHIMAZOLE 10 MG/1
10 TABLET ORAL DAILY
Qty: 30 TABLET | Refills: 3 | Status: SHIPPED | OUTPATIENT
Start: 2020-05-04 | End: 2020-05-20 | Stop reason: SDUPTHER

## 2020-05-05 NOTE — ED PROVIDER NOTES
----- Message from Parveen Babb sent at 5/5/2020 11:45 AM CDT -----  Contact: TITUS MUELLER [[id number]]   Name of Who is Calling:     What is the request in detail:  Patient request call back in reference to doctor excuse questions/concerns  ///patient is scheduled for  Tomorrow but doctor excuse has him returning to work  tomorrow  Please contact to further discuss and advise      Can the clinic reply by [facility]: no     What Number to Call Back if not in [facility]:  [phone]   Encounter Date: 7/22/2018    This is a SORT/MSE of a 33 y.o. female presenting to the ED with c/o right upper abdominal pain x5days. She reports the pain has progressively worsened to extend to the left upper and periumbilical area. Denies N/V?D today. Last normal BM today. 8/10 sharp  Stabbing pain constant.  Denies chest pain, shortness of breath, vaginal discharge, dysuria, hesitancy or frequency.     I have evaluated and conducted a medical screening exam with initial orders entered, if indicated, to expedite care. The patient will be placed in a treatment area when one is available. Care will be transferred to an alternate provider for a full assessment including but not limited to: history, physical exam, additional orders, and final disposition. MAXI Venegas, MIRNA-RYAN 7/22/2018 8:11 PM       History     Chief Complaint   Patient presents with    Abdominal Pain     generalized abdominal pain x 5days, states she has tried gas-x with no relief.      Chief complaint:  Epigastric abdominal pain x5 days    HPI:  This is a 33-year-old female who presents to the ED with complaints of right upper quadrant and epigastric abdominal pain for the last 5 days.  Patient states pain is acute, moderate and worse after eating or drinking.  She reports associated nausea, denies vomiting. She denies history of gallstones.  Patient states she drinks alcohol socially.  She denies diarrhea, fever, constipation.  No relief with Zantac or Gas-X.      The history is provided by the patient.     Review of patient's allergies indicates:   Allergen Reactions    Amoxicillin Hives    Penicillins      Past Medical History:   Diagnosis Date    Abnormal Pap smear of cervix      Past Surgical History:   Procedure Laterality Date    APPENDECTOMY      TUBAL LIGATION       Family History   Problem Relation Age of Onset    Breast cancer Neg Hx     Colon cancer Neg Hx     Ovarian cancer Neg Hx      Social History   Substance Use Topics     Smoking status: Current Every Day Smoker     Types: Cigarettes    Smokeless tobacco: Never Used    Alcohol use No     Review of Systems   Constitutional: Negative for chills and fever.   HENT: Negative for congestion and sore throat.    Respiratory: Negative for cough and shortness of breath.    Cardiovascular: Negative for chest pain.   Gastrointestinal: Positive for abdominal pain and nausea. Negative for constipation, diarrhea and vomiting.   Genitourinary: Negative for decreased urine volume, difficulty urinating and dysuria.   Musculoskeletal: Negative for back pain and myalgias.   Skin: Negative for rash.   Neurological: Negative for dizziness, seizures, syncope, weakness, light-headedness, numbness and headaches.   Hematological: Does not bruise/bleed easily.       Physical Exam     Initial Vitals [07/22/18 2012]   BP Pulse Resp Temp SpO2   (!) 157/82 92 18 98.2 °F (36.8 °C) 97 %      MAP       --         Physical Exam    Constitutional: Vital signs are normal. She appears well-developed and well-nourished.  Non-toxic appearance.   Eyes: EOM are normal.   Neck: Full passive range of motion without pain. Neck supple. No spinous process tenderness present. No neck rigidity.   Cardiovascular: Normal rate, S1 normal, S2 normal and normal heart sounds. Exam reveals no gallop.    No murmur heard.  Pulmonary/Chest: Effort normal and breath sounds normal. No tachypnea. She has no decreased breath sounds. She has no wheezes. She has no rhonchi. She has no rales.   Abdominal: Soft. Normal appearance. There is tenderness in the right upper quadrant and epigastric area. There is no CVA tenderness, no tenderness at McBurney's point and negative Cooper's sign.   Neurological: She is alert and oriented to person, place, and time. GCS eye subscore is 4. GCS verbal subscore is 5. GCS motor subscore is 6.   Skin: Skin is warm and dry. No rash noted.   Psychiatric: She has a normal mood and affect. Her speech is normal and  behavior is normal.         ED Course   Procedures  Labs Reviewed   COMPREHENSIVE METABOLIC PANEL - Abnormal; Notable for the following:        Result Value    Alkaline Phosphatase 42 (*)     All other components within normal limits   CBC W/ AUTO DIFFERENTIAL - Abnormal; Notable for the following:     MCV 81 (*)     Gran% 35.8 (*)     Lymph% 49.0 (*)     All other components within normal limits   URINALYSIS, REFLEX TO URINE CULTURE - Abnormal; Notable for the following:     Appearance, UA Cloudy (*)     All other components within normal limits    Narrative:     Preferred Collection Type->Urine, Clean Catch   URINALYSIS MICROSCOPIC - Abnormal; Notable for the following:     Amorphous, UA Many (*)     All other components within normal limits    Narrative:     Preferred Collection Type->Urine, Clean Catch   LIPASE   POCT URINE PREGNANCY          Imaging Results          US Abdomen Limited (Final result)  Result time 07/22/18 21:41:23    Final result by Marc Mora MD (07/22/18 21:41:23)                 Impression:      No significant abnormalities identified.      Electronically signed by: Marc Mora MD  Date:    07/22/2018  Time:    21:41             Narrative:    EXAMINATION:  US ABDOMEN LIMITED    CLINICAL HISTORY:  Abdominal Pain - Gallbladder;    TECHNIQUE:  Limited ultrasound of the right upper quadrant of the abdomen (including pancreas, liver, gallbladder, common bile duct, and right kidney) was performed.    COMPARISON:  None.    FINDINGS:  The liver is normal in size measuring 15.2cm.  Hepatic parenchyma is homogeneous without evidence for masses.  No intra- or extrahepatic biliary ductal dilatation. The common bile duct measures 0.4 cm.  The gallbladder appears normal. No evidence for cholelithiasis.  Sonographic Cooper's sign is negative. The visualized portions of the pancreas and IVC appear normal. The right kidney measures 10.5 cm. No ascites.                                 Medical  Decision Making:   ED Management:  This is a 33-year-old nonpregnant female who presents to the ED with complaints of epigastric and right upper quadrant abdominal pain every time she eats for the last 5 days.  She is afebrile and well appearing.  On exam, abdomen is soft with epigastric and right upper quadrant tenderness.  Laboratory evaluation including lipase unremarkable. Right upper quadrant ultrasound shows no evidence to suggest cholelithiasis or cholecystitis.  She denies black/bloody stools.  After receiving IV fluids, Pepcid, Zofran and GI cocktail she reports feeling much better.  No evidence to suggest acute pancreatitis, acute cholecystitis, bleeding ulcer or bowel obstruction.  Likely acute gastritis versus peptic ulcer.  Discharged home with prescriptions for Mylanta, Zofran and Pepcid.  Instructions given for supportive care and follow-up.  Return precautions given.                      Clinical Impression:   The encounter diagnosis was Epigastric pain.      Disposition:   Disposition: Discharged  Condition: Stable                        Reina Rivera NP  07/23/18 0038

## 2020-05-07 ENCOUNTER — TELEPHONE (OUTPATIENT)
Dept: ENDOCRINOLOGY | Facility: CLINIC | Age: 35
End: 2020-05-07

## 2020-05-07 NOTE — TELEPHONE ENCOUNTER
"Spoke w/ "Cely" from [facility] and requesting pt's chart note from 04/30.  Faxed to [phone].  "

## 2020-05-07 NOTE — TELEPHONE ENCOUNTER
----- Message from Everardo Hunt sent at 5/7/2020  3:14 PM CDT -----  Contact: Rosmery Malik [facility]    Requesting Patient Chart Notes faxed to provider from lab work 5-1-20.      [phone] ( office )      [phone] ( Fax )

## 2020-05-18 ENCOUNTER — PATIENT MESSAGE (OUTPATIENT)
Dept: ENDOCRINOLOGY | Facility: CLINIC | Age: 35
End: 2020-05-18

## 2020-05-18 ENCOUNTER — LAB VISIT (OUTPATIENT)
Dept: LAB | Facility: HOSPITAL | Age: 35
End: 2020-05-18
Attending: STUDENT IN AN ORGANIZED HEALTH CARE EDUCATION/TRAINING PROGRAM
Payer: MEDICAID

## 2020-05-18 DIAGNOSIS — E05.90 HYPERTHYROIDISM: ICD-10-CM

## 2020-05-18 LAB
T4 FREE SERPL-MCNC: 2.15 NG/DL (ref 0.71–1.51)
TSH SERPL DL<=0.005 MIU/L-ACNC: <0.01 UIU/ML (ref 0.4–4)

## 2020-05-18 PROCEDURE — 84439 ASSAY OF FREE THYROXINE: CPT

## 2020-05-18 PROCEDURE — 84443 ASSAY THYROID STIM HORMONE: CPT

## 2020-05-18 PROCEDURE — 36415 COLL VENOUS BLD VENIPUNCTURE: CPT | Mod: PO

## 2020-05-20 ENCOUNTER — TELEPHONE (OUTPATIENT)
Dept: ENDOCRINOLOGY | Facility: HOSPITAL | Age: 35
End: 2020-05-20

## 2020-05-20 DIAGNOSIS — E05.90 HYPERTHYROIDISM: Primary | ICD-10-CM

## 2020-05-20 RX ORDER — METHIMAZOLE 10 MG/1
20 TABLET ORAL DAILY
Qty: 60 TABLET | Refills: 2 | Status: SHIPPED | OUTPATIENT
Start: 2020-05-20 | End: 2020-09-17 | Stop reason: SDUPTHER

## 2020-05-20 NOTE — TELEPHONE ENCOUNTER
Discussed recent lab results with pt.   FT4 remains elevated. Pt continues to feel tired.     Advised pt to increase the dose of Methimazole to 20 mg, daily. Will repeat labs in 10 days.    Recommended to reach out sooner if she notices any new symptoms.

## 2020-05-21 ENCOUNTER — TELEPHONE (OUTPATIENT)
Dept: ENDOCRINOLOGY | Facility: CLINIC | Age: 35
End: 2020-05-21

## 2020-05-21 NOTE — TELEPHONE ENCOUNTER
----- Message from Kathy Parks RN sent at 5/21/2020  6:46 AM CDT -----      ----- Message -----  From: Hannah Gurrola MD  Sent: 5/20/2020   5:30 PM CDT  To: [facility] Staff    Please schedule labs (FT4) on June 1st.     Thanks

## 2020-06-12 ENCOUNTER — LAB VISIT (OUTPATIENT)
Dept: LAB | Facility: HOSPITAL | Age: 35
End: 2020-06-12
Attending: STUDENT IN AN ORGANIZED HEALTH CARE EDUCATION/TRAINING PROGRAM
Payer: MEDICAID

## 2020-06-12 DIAGNOSIS — E05.90 HYPERTHYROIDISM: ICD-10-CM

## 2020-06-12 LAB — T4 FREE SERPL-MCNC: 1.88 NG/DL (ref 0.71–1.51)

## 2020-06-12 PROCEDURE — 36415 COLL VENOUS BLD VENIPUNCTURE: CPT | Mod: PO

## 2020-06-12 PROCEDURE — 84439 ASSAY OF FREE THYROXINE: CPT

## 2020-06-13 DIAGNOSIS — E05.90 HYPERTHYROIDISM: Primary | ICD-10-CM

## 2020-06-15 ENCOUNTER — TELEPHONE (OUTPATIENT)
Dept: ENDOCRINOLOGY | Facility: CLINIC | Age: 35
End: 2020-06-15

## 2020-06-15 NOTE — TELEPHONE ENCOUNTER
----- Message from Hannah Gurrola MD sent at 6/13/2020  9:01 AM CDT -----  Please schedule labs (TSH & Ft4) in 2 weeks.   Thanks,Hannah

## 2020-06-29 ENCOUNTER — LAB VISIT (OUTPATIENT)
Dept: LAB | Facility: HOSPITAL | Age: 35
End: 2020-06-29
Attending: STUDENT IN AN ORGANIZED HEALTH CARE EDUCATION/TRAINING PROGRAM
Payer: MEDICAID

## 2020-06-29 DIAGNOSIS — E05.90 HYPERTHYROIDISM: ICD-10-CM

## 2020-06-29 LAB
T4 FREE SERPL-MCNC: 1.46 NG/DL (ref 0.71–1.51)
TSH SERPL DL<=0.005 MIU/L-ACNC: <0.01 UIU/ML (ref 0.4–4)

## 2020-06-29 PROCEDURE — 84443 ASSAY THYROID STIM HORMONE: CPT

## 2020-06-29 PROCEDURE — 84439 ASSAY OF FREE THYROXINE: CPT

## 2020-06-29 PROCEDURE — 36415 COLL VENOUS BLD VENIPUNCTURE: CPT | Mod: PO

## 2020-07-26 ENCOUNTER — TELEPHONE (OUTPATIENT)
Dept: ENDOCRINOLOGY | Facility: CLINIC | Age: 35
End: 2020-07-26

## 2020-07-26 DIAGNOSIS — E05.90 HYPERTHYROIDISM: Primary | ICD-10-CM

## 2020-07-26 NOTE — PROGRESS NOTES
I have personally taken the history and examined this patient and agree with the Dr. Gurrola's note as stated above.

## 2020-07-26 NOTE — TELEPHONE ENCOUNTER
Currently on MMI 10 mg daily   Labs improving    PLAN  continue MMI 10 mg daily  Repeat labs in two weeks.

## 2020-08-13 ENCOUNTER — TELEPHONE (OUTPATIENT)
Dept: ENDOCRINOLOGY | Facility: CLINIC | Age: 35
End: 2020-08-13

## 2020-08-13 DIAGNOSIS — E05.90 SUBCLINICAL HYPERTHYROIDISM: Primary | ICD-10-CM

## 2020-08-17 ENCOUNTER — LAB VISIT (OUTPATIENT)
Dept: LAB | Facility: HOSPITAL | Age: 35
End: 2020-08-17
Attending: INTERNAL MEDICINE
Payer: MEDICAID

## 2020-08-17 DIAGNOSIS — E05.90 SUBCLINICAL HYPERTHYROIDISM: ICD-10-CM

## 2020-08-17 LAB
T4 FREE SERPL-MCNC: 1.24 NG/DL (ref 0.71–1.51)
TSH SERPL DL<=0.005 MIU/L-ACNC: <0.01 UIU/ML (ref 0.4–4)

## 2020-08-17 PROCEDURE — 84439 ASSAY OF FREE THYROXINE: CPT

## 2020-08-17 PROCEDURE — 84443 ASSAY THYROID STIM HORMONE: CPT

## 2020-08-17 PROCEDURE — 36415 COLL VENOUS BLD VENIPUNCTURE: CPT | Mod: PO

## 2020-08-18 ENCOUNTER — PATIENT MESSAGE (OUTPATIENT)
Dept: ENDOCRINOLOGY | Facility: CLINIC | Age: 35
End: 2020-08-18

## 2020-08-18 DIAGNOSIS — E05.00 GRAVES' DISEASE: Primary | ICD-10-CM

## 2020-08-18 NOTE — TELEPHONE ENCOUNTER
Graves disease   smokerContinue MMI 10 mg (started May 2020)  Free T4 has remained in the normal range for the past six weeks with only slight decrease  Repeat in six weeks.

## 2020-09-17 ENCOUNTER — TELEPHONE (OUTPATIENT)
Dept: ENDOCRINOLOGY | Facility: CLINIC | Age: 35
End: 2020-09-17

## 2020-09-17 DIAGNOSIS — E05.90 HYPERTHYROIDISM: ICD-10-CM

## 2020-09-17 RX ORDER — METHIMAZOLE 10 MG/1
10 TABLET ORAL DAILY
Qty: 30 TABLET | Refills: 3 | Status: SHIPPED | OUTPATIENT
Start: 2020-09-17 | End: 2021-03-02

## 2020-09-17 RX ORDER — METHIMAZOLE 10 MG/1
20 TABLET ORAL DAILY
Qty: 60 TABLET | Refills: 6 | Status: CANCELLED | OUTPATIENT
Start: 2020-09-17 | End: 2021-09-17

## 2020-09-17 NOTE — TELEPHONE ENCOUNTER
Results for CHRYSTAL LAUREANO (MRN [id number]) as of 9/17/2020 14:50   Ref. Range 5/1/2020 10:00 5/18/2020 09:25 6/12/2020 11:17 6/29/2020 09:38 8/17/2020 12:23   TSH Latest Ref Range: 0.400 - 4.000 uIU/mL <0.010 (L) <0.010 (L)  <0.010 (L) <0.010 (L)   Free T4 Latest Ref Range: 0.71 - 1.51 ng/dL 2.24 (H) 2.15 (H) 1.88 (H) 1.46 1.24   Thyrotropin Receptor Ab Latest Ref Range: 0.00 - 1.75 IU/L 2.72 (H) 5/2020  MMI 10 mg daily       Continue mmi   Repeat in four weeks.     Results for CHRYSTAL LAUREANO (MRN [id number]) as of 9/17/2020 14:50   Ref. Range 5/1/2020 10:00   Alkaline Phosphatase Latest Ref Range: 55 - 135 U/L 66   PROTEIN TOTAL Latest Ref Range: 6.0 - 8.4 g/dL 7.0   Albumin Latest Ref Range: 3.5 - 5.2 g/dL 3.6   BILIRUBIN TOTAL Latest Ref Range: 0.1 - 1.0 mg/dL 0.4   Bilirubin, Direct Latest Ref Range: 0.1 - 0.3 mg/dL 0.2   AST Latest Ref Range: 10 - 40 U/L 23   ALT Latest Ref Range: 10 - 44 U/L 24   Results for CHRYSTAL LAUREANO (MRN [id number]) as of 9/17/2020 14:50   Ref. Range 7/22/2018 20:37   Hemoglobin Latest Ref Range: 12.0 - 16.0 g/dL 13.1   Hematocrit Latest Ref Range: 37.0 - 48.5 % 37.2

## 2020-10-16 ENCOUNTER — LAB VISIT (OUTPATIENT)
Dept: LAB | Facility: HOSPITAL | Age: 35
End: 2020-10-16
Attending: INTERNAL MEDICINE
Payer: MEDICAID

## 2020-10-16 DIAGNOSIS — E05.90 HYPERTHYROIDISM: ICD-10-CM

## 2020-10-16 LAB
T4 FREE SERPL-MCNC: 0.97 NG/DL (ref 0.71–1.51)
TSH SERPL DL<=0.005 MIU/L-ACNC: <0.01 UIU/ML (ref 0.4–4)

## 2020-10-16 PROCEDURE — 36415 COLL VENOUS BLD VENIPUNCTURE: CPT | Mod: PO

## 2020-10-16 PROCEDURE — 84443 ASSAY THYROID STIM HORMONE: CPT

## 2020-10-16 PROCEDURE — 84439 ASSAY OF FREE THYROXINE: CPT

## 2020-11-04 ENCOUNTER — LAB VISIT (OUTPATIENT)
Dept: LAB | Facility: HOSPITAL | Age: 35
End: 2020-11-04
Attending: INTERNAL MEDICINE
Payer: MEDICAID

## 2020-11-04 DIAGNOSIS — E05.00 GRAVES' DISEASE: ICD-10-CM

## 2020-11-04 LAB
T4 FREE SERPL-MCNC: 0.9 NG/DL (ref 0.71–1.51)
TSH SERPL DL<=0.005 MIU/L-ACNC: 0.04 UIU/ML (ref 0.4–4)

## 2020-11-04 PROCEDURE — 36415 COLL VENOUS BLD VENIPUNCTURE: CPT | Mod: PO

## 2020-11-04 PROCEDURE — 84439 ASSAY OF FREE THYROXINE: CPT

## 2020-11-04 PROCEDURE — 84443 ASSAY THYROID STIM HORMONE: CPT

## 2020-11-19 ENCOUNTER — LAB VISIT (OUTPATIENT)
Dept: LAB | Facility: OTHER | Age: 35
End: 2020-11-19
Attending: OBSTETRICS & GYNECOLOGY
Payer: MEDICAID

## 2020-11-19 ENCOUNTER — OFFICE VISIT (OUTPATIENT)
Dept: OBSTETRICS AND GYNECOLOGY | Facility: CLINIC | Age: 35
End: 2020-11-19
Attending: OBSTETRICS & GYNECOLOGY
Payer: MEDICAID

## 2020-11-19 VITALS
WEIGHT: 151.44 LBS | DIASTOLIC BLOOD PRESSURE: 78 MMHG | BODY MASS INDEX: 27.87 KG/M2 | HEIGHT: 62 IN | SYSTOLIC BLOOD PRESSURE: 118 MMHG

## 2020-11-19 DIAGNOSIS — Z20.2 POSSIBLE EXPOSURE TO STD: ICD-10-CM

## 2020-11-19 DIAGNOSIS — Z71.85 HPV VACCINE COUNSELING: ICD-10-CM

## 2020-11-19 DIAGNOSIS — Z01.419 WELL WOMAN EXAM: Primary | ICD-10-CM

## 2020-11-19 PROCEDURE — 86592 SYPHILIS TEST NON-TREP QUAL: CPT

## 2020-11-19 PROCEDURE — 87624 HPV HI-RISK TYP POOLED RSLT: CPT

## 2020-11-19 PROCEDURE — 99213 OFFICE O/P EST LOW 20 MIN: CPT | Mod: PBBFAC | Performed by: OBSTETRICS & GYNECOLOGY

## 2020-11-19 PROCEDURE — 99385 PR PREVENTIVE VISIT,NEW,18-39: ICD-10-PCS | Mod: S$PBB,,, | Performed by: OBSTETRICS & GYNECOLOGY

## 2020-11-19 PROCEDURE — 80074 ACUTE HEPATITIS PANEL: CPT

## 2020-11-19 PROCEDURE — 86703 HIV-1/HIV-2 1 RESULT ANTBDY: CPT

## 2020-11-19 PROCEDURE — 99999 PR PBB SHADOW E&M-EST. PATIENT-LVL III: CPT | Mod: PBBFAC,,, | Performed by: OBSTETRICS & GYNECOLOGY

## 2020-11-19 PROCEDURE — 88175 CYTOPATH C/V AUTO FLUID REDO: CPT

## 2020-11-19 PROCEDURE — 99385 PREV VISIT NEW AGE 18-39: CPT | Mod: S$PBB,,, | Performed by: OBSTETRICS & GYNECOLOGY

## 2020-11-19 PROCEDURE — 99999 PR PBB SHADOW E&M-EST. PATIENT-LVL III: ICD-10-PCS | Mod: PBBFAC,,, | Performed by: OBSTETRICS & GYNECOLOGY

## 2020-11-19 PROCEDURE — 86696 HERPES SIMPLEX TYPE 2 TEST: CPT

## 2020-11-19 PROCEDURE — 36415 COLL VENOUS BLD VENIPUNCTURE: CPT

## 2020-11-19 RX ORDER — ATENOLOL 100 MG/1
100 TABLET ORAL
COMMUNITY
Start: 2020-08-21

## 2020-11-19 NOTE — PROGRESS NOTES
CC: Well woman exam    Savannah Tijerina is a 35 y.o. female  presents for well woman exam.  LMP: Patient's last menstrual period was 2020..  No issues, problems, or complaints.      Cycles regular, normal flow.  Last pap .  She has not had gardasil.    Requests full std testing.    Past Medical History:   Diagnosis Date    Abnormal Pap smear of cervix      Past Surgical History:   Procedure Laterality Date    APPENDECTOMY      TUBAL LIGATION       Social History     Socioeconomic History    Marital status:      Spouse name: Not on file    Number of children: Not on file    Years of education: Not on file    Highest education level: Not on file   Occupational History    Not on file   Social Needs    Financial resource strain: Not on file    Food insecurity     Worry: Not on file     Inability: Not on file    Transportation needs     Medical: Not on file     Non-medical: Not on file   Tobacco Use    Smoking status: Current Every Day Smoker     Types: Cigarettes    Smokeless tobacco: Never Used   Substance and Sexual Activity    Alcohol use: No    Drug use: No    Sexual activity: Yes     Partners: Male     Birth control/protection: Surgical     Comment: tubal   Lifestyle    Physical activity     Days per week: Not on file     Minutes per session: Not on file    Stress: Not on file   Relationships    Social connections     Talks on phone: Not on file     Gets together: Not on file     Attends Judaism service: Not on file     Active member of club or organization: Not on file     Attends meetings of clubs or organizations: Not on file     Relationship status: Not on file   Other Topics Concern    Not on file   Social History Narrative    Not on file     Family History   Problem Relation Age of Onset    Breast cancer Neg Hx     Colon cancer Neg Hx     Ovarian cancer Neg Hx      OB History        3    Para   3    Term   3            AB        Living      "      SAB        TAB        Ectopic        Multiple   3    Live Births                     /78   Ht 5' 2" (1.575 m)   Wt 68.7 kg (151 lb 7.3 oz)   LMP 11/04/2020   BMI 27.70 kg/m²       ROS:  GENERAL: Denies weight gain or weight loss. Feeling well overall.   SKIN: Denies rash or lesions.   HEAD: Denies head injury or headache.   NODES: Denies enlarged lymph nodes.   CHEST: Denies chest pain or shortness of breath.   CARDIOVASCULAR: Denies palpitations or left sided chest pain.   ABDOMEN: No abdominal pain, constipation, diarrhea, nausea, vomiting or rectal bleeding.   URINARY: No frequency, dysuria, hematuria, or burning on urination.  REPRODUCTIVE: See HPI.   BREASTS: The patient performs breast self-examination and denies pain, lumps, or nipple discharge.   HEMATOLOGIC: No easy bruisability or excessive bleeding.   MUSCULOSKELETAL: Denies joint pain or swelling.   NEUROLOGIC: Denies syncope or weakness.   PSYCHIATRIC: Denies depression, anxiety or mood swings.    PHYSICAL EXAM:  APPEARANCE: Well nourished, well developed, in no acute distress.  AFFECT: WNL, alert and oriented x 3  SKIN: No acne or hirsutism  NECK: Neck symmetric without masses or thyromegaly  NODES: No inguinal, cervical, axillary, or femoral lymph node enlargement  CHEST: Good respiratory effect  ABDOMEN: Soft.  No tenderness or masses.  No hepatosplenomegaly.  No hernias.  BREASTS: Symmetrical, no skin changes or visible lesions.  No palpable masses, nipple discharge bilaterally.  PELVIC: Normal external genitalia without lesions.  Normal hair distribution.  Adequate perineal body, normal urethral meatus.  Vagina moist and well rugated without lesions or discharge.  Cervix pink, without lesions, discharge or tenderness.  No significant cystocele or rectocele.  Bimanual exam shows uterus to be normal size, regular, mobile and nontender.  Adnexa without masses or tenderness.    EXTREMITIES: No edema.    ASSESSMENT    ICD-10-CM ICD-9-CM  "   1. Well woman exam  Z01.419 V72.31 Liquid-Based Pap Smear, Screening      HPV High Risk Genotypes, PCR   2. Possible exposure to STD  Z20.2 V01.6 NuSwab Vaginitis Plus (VG+)      HIV 1/2 Ag/Ab (4th Gen)      RPR      Hepatitis Panel, Acute      HSV 1 & 2, IgG   3. HPV vaccine counseling  Z71.89 V65.49 (In Office Administered) HPV Vaccine (9-Valent) (3 Dose) (IM)         PLAN:  Well woman exam  -     Liquid-Based Pap Smear, Screening  -     HPV High Risk Genotypes, PCR    Possible exposure to STD  -     NuSwab Vaginitis Plus (VG+)  -     HIV 1/2 Ag/Ab (4th Gen); Future; Expected date: 11/19/2020  -     RPR; Future; Expected date: 11/19/2020  -     Hepatitis Panel, Acute; Future; Expected date: 11/19/2020  -     HSV 1 & 2, IgG; Future; Expected date: 11/19/2020    HPV vaccine counseling  -     (In Office Administered) HPV Vaccine (9-Valent) (3 Dose) (IM); Future; Expected date: 11/20/2020            Patient was counseled today on A.C.S. Pap guidelines and recommendations for yearly pelvic exams, mammograms and monthly self breast exams; to see her PCP for other health maintenance.

## 2020-11-20 LAB
HAV IGM SERPL QL IA: NEGATIVE
HBV CORE IGM SERPL QL IA: NEGATIVE
HBV SURFACE AG SERPL QL IA: NEGATIVE
HCV AB SERPL QL IA: NEGATIVE
HIV 1+2 AB+HIV1 P24 AG SERPL QL IA: NEGATIVE
HSV1 IGG SERPL QL IA: POSITIVE
HSV2 IGG SERPL QL IA: NEGATIVE
RPR SER QL: NORMAL

## 2020-11-23 ENCOUNTER — PATIENT MESSAGE (OUTPATIENT)
Dept: OBSTETRICS AND GYNECOLOGY | Facility: CLINIC | Age: 35
End: 2020-11-23

## 2020-11-24 LAB
HPV HR 12 DNA SPEC QL NAA+PROBE: NEGATIVE
HPV16 AG SPEC QL: NEGATIVE
HPV18 DNA SPEC QL NAA+PROBE: NEGATIVE

## 2020-11-27 LAB
A VAGINAE DNA VAG QL NAA+PROBE: NORMAL SCORE
BVAB2 DNA VAG QL NAA+PROBE: NORMAL SCORE
C ALBICANS DNA VAG QL NAA+PROBE: NEGATIVE
C GLABRATA DNA VAG QL NAA+PROBE: NEGATIVE
C TRACH DNA VAG QL NAA+PROBE: NEGATIVE
MEGA1 DNA VAG QL NAA+PROBE: NORMAL SCORE
N GONORRHOEA DNA VAG QL NAA+PROBE: NEGATIVE
T VAGINALIS DNA VAG QL NAA+PROBE: NEGATIVE

## 2020-11-29 ENCOUNTER — PATIENT MESSAGE (OUTPATIENT)
Dept: ENDOCRINOLOGY | Facility: CLINIC | Age: 35
End: 2020-11-29

## 2020-11-29 DIAGNOSIS — E05.90 HYPERTHYROIDISM: Primary | ICD-10-CM

## 2021-01-04 ENCOUNTER — LAB VISIT (OUTPATIENT)
Dept: LAB | Facility: HOSPITAL | Age: 36
End: 2021-01-04
Attending: INTERNAL MEDICINE
Payer: MEDICAID

## 2021-01-04 DIAGNOSIS — E05.90 HYPERTHYROIDISM: ICD-10-CM

## 2021-01-04 LAB
T4 FREE SERPL-MCNC: 0.94 NG/DL (ref 0.71–1.51)
TSH SERPL DL<=0.005 MIU/L-ACNC: <0.01 UIU/ML (ref 0.4–4)

## 2021-01-04 PROCEDURE — 84439 ASSAY OF FREE THYROXINE: CPT

## 2021-01-04 PROCEDURE — 84443 ASSAY THYROID STIM HORMONE: CPT

## 2021-01-04 PROCEDURE — 36415 COLL VENOUS BLD VENIPUNCTURE: CPT | Mod: PO

## 2021-01-07 LAB
FINAL PATHOLOGIC DIAGNOSIS: NORMAL
Lab: NORMAL

## 2021-01-22 ENCOUNTER — PATIENT MESSAGE (OUTPATIENT)
Dept: ENDOCRINOLOGY | Facility: CLINIC | Age: 36
End: 2021-01-22

## 2021-04-15 ENCOUNTER — PATIENT MESSAGE (OUTPATIENT)
Dept: RESEARCH | Facility: HOSPITAL | Age: 36
End: 2021-04-15

## 2021-11-02 ENCOUNTER — OFFICE VISIT (OUTPATIENT)
Dept: URGENT CARE | Facility: CLINIC | Age: 36
End: 2021-11-02
Payer: MEDICAID

## 2021-11-02 VITALS
WEIGHT: 151 LBS | SYSTOLIC BLOOD PRESSURE: 119 MMHG | BODY MASS INDEX: 27.79 KG/M2 | OXYGEN SATURATION: 97 % | TEMPERATURE: 98 F | RESPIRATION RATE: 16 BRPM | HEIGHT: 62 IN | HEART RATE: 71 BPM | DIASTOLIC BLOOD PRESSURE: 82 MMHG

## 2021-11-02 DIAGNOSIS — J06.9 VIRAL URI WITH COUGH: Primary | ICD-10-CM

## 2021-11-02 DIAGNOSIS — R05.9 COUGH: ICD-10-CM

## 2021-11-02 LAB
CTP QC/QA: YES
SARS-COV-2 RDRP RESP QL NAA+PROBE: NEGATIVE

## 2021-11-02 PROCEDURE — U0002 COVID-19 LAB TEST NON-CDC: HCPCS | Mod: QW,S$GLB,, | Performed by: PHYSICIAN ASSISTANT

## 2021-11-02 PROCEDURE — U0002: ICD-10-PCS | Mod: QW,S$GLB,, | Performed by: PHYSICIAN ASSISTANT

## 2021-11-02 PROCEDURE — 99203 OFFICE O/P NEW LOW 30 MIN: CPT | Mod: S$GLB,,, | Performed by: PHYSICIAN ASSISTANT

## 2021-11-02 PROCEDURE — 99203 PR OFFICE/OUTPT VISIT, NEW, LEVL III, 30-44 MIN: ICD-10-PCS | Mod: S$GLB,,, | Performed by: PHYSICIAN ASSISTANT

## 2021-11-02 RX ORDER — BENZONATATE 100 MG/1
100 CAPSULE ORAL 3 TIMES DAILY PRN
Qty: 30 CAPSULE | Refills: 0 | Status: SHIPPED | OUTPATIENT
Start: 2021-11-02 | End: 2021-11-12

## 2021-11-02 RX ORDER — BENZONATATE 100 MG/1
100 CAPSULE ORAL 3 TIMES DAILY PRN
Qty: 30 CAPSULE | Refills: 0 | Status: SHIPPED | OUTPATIENT
Start: 2021-11-02 | End: 2021-11-02

## 2021-11-16 ENCOUNTER — PATIENT MESSAGE (OUTPATIENT)
Dept: ENDOCRINOLOGY | Facility: CLINIC | Age: 36
End: 2021-11-16
Payer: MEDICAID

## 2021-11-16 DIAGNOSIS — E05.90 HYPERTHYROIDISM: ICD-10-CM

## 2021-11-16 RX ORDER — METHIMAZOLE 10 MG/1
TABLET ORAL
Qty: 30 TABLET | Refills: 3 | OUTPATIENT
Start: 2021-11-16

## 2021-11-16 RX ORDER — METHIMAZOLE 10 MG/1
10 TABLET ORAL DAILY
Qty: 30 TABLET | Refills: 2 | Status: SHIPPED | OUTPATIENT
Start: 2021-11-16 | End: 2022-01-19

## 2022-01-19 ENCOUNTER — LAB VISIT (OUTPATIENT)
Dept: PRIMARY CARE CLINIC | Facility: CLINIC | Age: 37
End: 2022-01-19
Payer: MEDICAID

## 2022-01-19 DIAGNOSIS — Z20.822 CONTACT WITH AND (SUSPECTED) EXPOSURE TO COVID-19: ICD-10-CM

## 2022-01-19 LAB
CTP QC/QA: YES
SARS-COV-2 AG RESP QL IA.RAPID: NEGATIVE

## 2022-01-19 PROCEDURE — 87811 SARS-COV-2 COVID19 W/OPTIC: CPT

## 2022-02-07 ENCOUNTER — TELEPHONE (OUTPATIENT)
Dept: ENDOCRINOLOGY | Facility: CLINIC | Age: 37
End: 2022-02-07
Payer: MEDICAID

## 2022-02-07 NOTE — PROGRESS NOTES
"Subjective:      Patient ID: Savannah Tijerina is a 36 y.o. female.    Chief Complaint:  Hyperthyroidism    History of Present Illness  Savannah Tijerina is here for follow up of hyperthyroidism.  Previously seen by Dr. Munson.  Last seen 4/30/2020.  This is their first visit with me.      With regards to hyperthyroidism:    Diagnosed with hyperthyroidism based on the labs from Dec, 2019 (in media): TSH: <0.005; FT4: 3.65    Diagnosed Graves disease 5/2020.     Ref. Range 5/1/2020 10:00   Thyrotropin Receptor Ab Latest Ref Range: 0.00 - 1.75 IU/L 2.72 (H)       Lab Results   Component Value Date    TSH <0.010 (L) 01/04/2021    FREET4 0.94 01/04/2021     Denies FH of thyroid disease or thyroid cancer.   Has three children 19 and twin 17 year olds.  Denies plans for fertility.   Reports regular menstrual cycles - every 26-30 days, lasts for about 5 days.     Current Symptoms:   Denies Palpations  Denies Weight loss - reports weight gain about 60 pounds since diagnosis   Denies Diarrhea  Denies Hair loss  Denies Brittle nails  Denies Skin changes  Reports rare Tremor   Denies Anxiety    Biotin Use: Denies    Current Medication:  Start Date 4/2020  Methimazole 10mg daily    Reports compliance.    Any recent (3-6 months) iodine or contrast?  Denies    Smoke:   Yes - 1/2 pack a day     Thyroid tenderness?   Denies    Graves Eye Clinical Activity: 3/7=Active  Eye pain? Denies  Eye pain with movement? Denies  Eyelid erythema? Denies  Erythema of conjunctiva? Denies  Caruncle inflammation? Denies  Eyelid swelling? Denies    Review of Systems     As above    Objective:   Physical Exam  Vitals reviewed.   Neck:      Thyroid: No thyromegaly (irregular shaped gland).   Cardiovascular:      Rate and Rhythm: Normal rate.      Comments: No edema present  Pulmonary:      Effort: Pulmonary effort is normal.   Abdominal:      Palpations: Abdomen is soft.         Visit Vitals  /79   Pulse 70   Ht 5' 2" (1.575 m)   Wt 95.1 " kg (209 lb 10.5 oz)   SpO2 97%   BMI 38.35 kg/m²       Body mass index is 38.35 kg/m².    Lab Review:   No results found for: HGBA1C    No results found for: CHOL, HDL, LDLCALC, TRIG, CHOLHDL  Lab Results   Component Value Date     07/22/2018    K 3.9 07/22/2018     07/22/2018    CO2 25 07/22/2018     07/22/2018    BUN 12 07/22/2018    CREATININE 0.7 07/22/2018    CALCIUM 8.8 07/22/2018    PROT 7.0 05/01/2020    ALBUMIN 3.6 05/01/2020    BILITOT 0.4 05/01/2020    ALKPHOS 66 05/01/2020    AST 23 05/01/2020    ALT 24 05/01/2020    ANIONGAP 8 07/22/2018    ESTGFRAFRICA >60 07/22/2018    EGFRNONAA >60 07/22/2018    TSH <0.010 (L) 01/04/2021     No results found for: [id number]  Assessment and Plan     1. Hyperthyroidism  Ambulatory referral/consult to Ophthalmology    CBC Auto Differential    Comprehensive Metabolic Panel    TSH    T4, Free    Thyrotropin Receptor Antibody    US Soft Tissue Head Neck Thyroid   2. Thyromegaly  US Soft Tissue Head Neck Thyroid       Hyperthyroidism  -- Diagnosed with Graves disease 4/2020.  -- Reviewed possible options of methimazole, I131 therapy and surgery.  -- Discussed that if we proceed with i131 treatment to follow a low iodine diet for 1 week prior. Handout provided.   -- Graves' disease:   Extensive counseling today regarding the diagnosis of Graves' disease, the various options for treatment including thionamide, surgery, or I-131 therapy. We discussed the pros and cons of each treatment option, including contraindications to ALMEIDA if considering pregnancy in next 6-12 months. Reviewed that with methimazole, the course is ~18 months of treatment and then evaluate for remission. Surgery is most definitive, I-131 takes 2-6 months. Would need lifelong thyroid hormone after surgery or I-131 Rx.   She is NOT considering pregnancy at this point but we reviewed that if this changes or if she becomes pregnant she must call office immediately because of the rare but  established teratogenicity of MMI   Reviewed extra-systemic manifestations of Graves' including orbitopathy. Reviewed that cigarette smoking is a known risk factor for thyroid associated orbitopathy and advised to be watchful and avoid smoke.   -- Thionamide monitoring: I have reviewed the risk of agranulocytosis that can occur in 1 of 500 patients who are taking either PTU or methimazole. I have also reviewed the even rarer risk of hepatic dysfunction. A baseline CBC with differential and Liver function tests are available. The instruction sheet was given to the patient that describes these risks, warning symptoms, and instructions to stop the medication, contact the covering endocrinology fellow, and check blood tests.   -- Labs today adjust dose based on labs.  -- Discussed treatment options - patient prefers to continue with ATD therapy.  -- Continue:  Start Date 4/2020  Methimazole 10mg daily      Follow up in about 1 year (around 2/8/2023).

## 2022-02-08 ENCOUNTER — LAB VISIT (OUTPATIENT)
Dept: LAB | Facility: HOSPITAL | Age: 37
End: 2022-02-08
Payer: MEDICAID

## 2022-02-08 ENCOUNTER — OFFICE VISIT (OUTPATIENT)
Dept: ENDOCRINOLOGY | Facility: CLINIC | Age: 37
End: 2022-02-08
Payer: MEDICAID

## 2022-02-08 VITALS
DIASTOLIC BLOOD PRESSURE: 79 MMHG | BODY MASS INDEX: 38.59 KG/M2 | HEIGHT: 62 IN | OXYGEN SATURATION: 97 % | WEIGHT: 209.69 LBS | HEART RATE: 70 BPM | SYSTOLIC BLOOD PRESSURE: 119 MMHG

## 2022-02-08 DIAGNOSIS — E01.0 THYROMEGALY: ICD-10-CM

## 2022-02-08 DIAGNOSIS — E05.90 HYPERTHYROIDISM: Primary | ICD-10-CM

## 2022-02-08 DIAGNOSIS — E05.90 HYPERTHYROIDISM: ICD-10-CM

## 2022-02-08 LAB
ALBUMIN SERPL BCP-MCNC: 3.6 G/DL (ref 3.5–5.2)
ALP SERPL-CCNC: 53 U/L (ref 55–135)
ALT SERPL W/O P-5'-P-CCNC: 15 U/L (ref 10–44)
ANION GAP SERPL CALC-SCNC: 6 MMOL/L (ref 8–16)
AST SERPL-CCNC: 16 U/L (ref 10–40)
BASOPHILS # BLD AUTO: 0.02 K/UL (ref 0–0.2)
BASOPHILS NFR BLD: 0.3 % (ref 0–1.9)
BILIRUB SERPL-MCNC: 0.6 MG/DL (ref 0.1–1)
BUN SERPL-MCNC: 13 MG/DL (ref 6–20)
CALCIUM SERPL-MCNC: 9.2 MG/DL (ref 8.7–10.5)
CHLORIDE SERPL-SCNC: 102 MMOL/L (ref 95–110)
CO2 SERPL-SCNC: 25 MMOL/L (ref 23–29)
CREAT SERPL-MCNC: 0.7 MG/DL (ref 0.5–1.4)
DIFFERENTIAL METHOD: NORMAL
EOSINOPHIL # BLD AUTO: 0.1 K/UL (ref 0–0.5)
EOSINOPHIL NFR BLD: 1.8 % (ref 0–8)
ERYTHROCYTE [DISTWIDTH] IN BLOOD BY AUTOMATED COUNT: 12.9 % (ref 11.5–14.5)
EST. GFR  (AFRICAN AMERICAN): >60 ML/MIN/1.73 M^2
EST. GFR  (NON AFRICAN AMERICAN): >60 ML/MIN/1.73 M^2
GLUCOSE SERPL-MCNC: 90 MG/DL (ref 70–110)
HCT VFR BLD AUTO: 39.3 % (ref 37–48.5)
HGB BLD-MCNC: 13 G/DL (ref 12–16)
IMM GRANULOCYTES # BLD AUTO: 0.02 K/UL (ref 0–0.04)
IMM GRANULOCYTES NFR BLD AUTO: 0.3 % (ref 0–0.5)
LYMPHOCYTES # BLD AUTO: 2.5 K/UL (ref 1–4.8)
LYMPHOCYTES NFR BLD: 32 % (ref 18–48)
MCH RBC QN AUTO: 27.7 PG (ref 27–31)
MCHC RBC AUTO-ENTMCNC: 33.1 G/DL (ref 32–36)
MCV RBC AUTO: 84 FL (ref 82–98)
MONOCYTES # BLD AUTO: 0.5 K/UL (ref 0.3–1)
MONOCYTES NFR BLD: 5.9 % (ref 4–15)
NEUTROPHILS # BLD AUTO: 4.6 K/UL (ref 1.8–7.7)
NEUTROPHILS NFR BLD: 59.7 % (ref 38–73)
NRBC BLD-RTO: 0 /100 WBC
PLATELET # BLD AUTO: 361 K/UL (ref 150–450)
PMV BLD AUTO: 9.6 FL (ref 9.2–12.9)
POTASSIUM SERPL-SCNC: 4.2 MMOL/L (ref 3.5–5.1)
PROT SERPL-MCNC: 7.2 G/DL (ref 6–8.4)
RBC # BLD AUTO: 4.7 M/UL (ref 4–5.4)
SODIUM SERPL-SCNC: 133 MMOL/L (ref 136–145)
T4 FREE SERPL-MCNC: 0.65 NG/DL (ref 0.71–1.51)
TSH SERPL DL<=0.005 MIU/L-ACNC: 14.57 UIU/ML (ref 0.4–4)
WBC # BLD AUTO: 7.66 K/UL (ref 3.9–12.7)

## 2022-02-08 PROCEDURE — 1159F PR MEDICATION LIST DOCUMENTED IN MEDICAL RECORD: ICD-10-PCS | Mod: CPTII,,, | Performed by: NURSE PRACTITIONER

## 2022-02-08 PROCEDURE — 99999 PR PBB SHADOW E&M-EST. PATIENT-LVL IV: CPT | Mod: PBBFAC,,, | Performed by: NURSE PRACTITIONER

## 2022-02-08 PROCEDURE — 3078F PR MOST RECENT DIASTOLIC BLOOD PRESSURE < 80 MM HG: ICD-10-PCS | Mod: CPTII,,, | Performed by: NURSE PRACTITIONER

## 2022-02-08 PROCEDURE — 3074F PR MOST RECENT SYSTOLIC BLOOD PRESSURE < 130 MM HG: ICD-10-PCS | Mod: CPTII,,, | Performed by: NURSE PRACTITIONER

## 2022-02-08 PROCEDURE — 99214 OFFICE O/P EST MOD 30 MIN: CPT | Mod: S$PBB,,, | Performed by: NURSE PRACTITIONER

## 2022-02-08 PROCEDURE — 3008F BODY MASS INDEX DOCD: CPT | Mod: CPTII,,, | Performed by: NURSE PRACTITIONER

## 2022-02-08 PROCEDURE — 99214 OFFICE O/P EST MOD 30 MIN: CPT | Mod: PBBFAC | Performed by: NURSE PRACTITIONER

## 2022-02-08 PROCEDURE — 3008F PR BODY MASS INDEX (BMI) DOCUMENTED: ICD-10-PCS | Mod: CPTII,,, | Performed by: NURSE PRACTITIONER

## 2022-02-08 PROCEDURE — 1160F RVW MEDS BY RX/DR IN RCRD: CPT | Mod: CPTII,,, | Performed by: NURSE PRACTITIONER

## 2022-02-08 PROCEDURE — 3074F SYST BP LT 130 MM HG: CPT | Mod: CPTII,,, | Performed by: NURSE PRACTITIONER

## 2022-02-08 PROCEDURE — 80053 COMPREHEN METABOLIC PANEL: CPT | Performed by: NURSE PRACTITIONER

## 2022-02-08 PROCEDURE — 36415 COLL VENOUS BLD VENIPUNCTURE: CPT | Performed by: NURSE PRACTITIONER

## 2022-02-08 PROCEDURE — 83520 IMMUNOASSAY QUANT NOS NONAB: CPT | Performed by: NURSE PRACTITIONER

## 2022-02-08 PROCEDURE — 84439 ASSAY OF FREE THYROXINE: CPT | Performed by: NURSE PRACTITIONER

## 2022-02-08 PROCEDURE — 99999 PR PBB SHADOW E&M-EST. PATIENT-LVL IV: ICD-10-PCS | Mod: PBBFAC,,, | Performed by: NURSE PRACTITIONER

## 2022-02-08 PROCEDURE — 1160F PR REVIEW ALL MEDS BY PRESCRIBER/CLIN PHARMACIST DOCUMENTED: ICD-10-PCS | Mod: CPTII,,, | Performed by: NURSE PRACTITIONER

## 2022-02-08 PROCEDURE — 84443 ASSAY THYROID STIM HORMONE: CPT | Performed by: NURSE PRACTITIONER

## 2022-02-08 PROCEDURE — 3078F DIAST BP <80 MM HG: CPT | Mod: CPTII,,, | Performed by: NURSE PRACTITIONER

## 2022-02-08 PROCEDURE — 1159F MED LIST DOCD IN RCRD: CPT | Mod: CPTII,,, | Performed by: NURSE PRACTITIONER

## 2022-02-08 PROCEDURE — 85025 COMPLETE CBC W/AUTO DIFF WBC: CPT | Performed by: NURSE PRACTITIONER

## 2022-02-08 PROCEDURE — 99214 PR OFFICE/OUTPT VISIT, EST, LEVL IV, 30-39 MIN: ICD-10-PCS | Mod: S$PBB,,, | Performed by: NURSE PRACTITIONER

## 2022-02-08 RX ORDER — ATENOLOL 100 MG/1
1 TABLET ORAL DAILY
COMMUNITY
Start: 2021-03-05 | End: 2022-03-05

## 2022-02-08 NOTE — ASSESSMENT & PLAN NOTE
-- Diagnosed with Graves disease 4/2020.  -- Reviewed possible options of methimazole, I131 therapy and surgery.  -- Discussed that if we proceed with i131 treatment to follow a low iodine diet for 1 week prior. Handout provided.   -- Graves' disease:   Extensive counseling today regarding the diagnosis of Graves' disease, the various options for treatment including thionamide, surgery, or I-131 therapy. We discussed the pros and cons of each treatment option, including contraindications to ALMEIDA if considering pregnancy in next 6-12 months. Reviewed that with methimazole, the course is ~18 months of treatment and then evaluate for remission. Surgery is most definitive, I-131 takes 2-6 months. Would need lifelong thyroid hormone after surgery or I-131 Rx.   She is NOT considering pregnancy at this point but we reviewed that if this changes or if she becomes pregnant she must call office immediately because of the rare but established teratogenicity of MMI   Reviewed extra-systemic manifestations of Graves' including orbitopathy. Reviewed that cigarette smoking is a known risk factor for thyroid associated orbitopathy and advised to be watchful and avoid smoke.   -- Thionamide monitoring: I have reviewed the risk of agranulocytosis that can occur in 1 of 500 patients who are taking either PTU or methimazole. I have also reviewed the even rarer risk of hepatic dysfunction. A baseline CBC with differential and Liver function tests are available. The instruction sheet was given to the patient that describes these risks, warning symptoms, and instructions to stop the medication, contact the covering endocrinology fellow, and check blood tests.   -- Labs today adjust dose based on labs.  -- Discussed treatment options - patient prefers to continue with ATD therapy.  -- Continue:  Start Date 4/2020  Methimazole 10mg daily

## 2022-02-09 ENCOUNTER — HOSPITAL ENCOUNTER (OUTPATIENT)
Dept: RADIOLOGY | Facility: HOSPITAL | Age: 37
Discharge: HOME | End: 2022-02-09
Attending: NURSE PRACTITIONER
Payer: MEDICAID

## 2022-02-09 DIAGNOSIS — E05.90 HYPERTHYROIDISM: ICD-10-CM

## 2022-02-09 DIAGNOSIS — E01.0 THYROMEGALY: ICD-10-CM

## 2022-02-09 LAB — TSH RECEP AB SER-ACNC: <1.1 IU/L (ref 0–1.75)

## 2022-02-09 PROCEDURE — 76536 US EXAM OF HEAD AND NECK: CPT | Mod: 26,,, | Performed by: RADIOLOGY

## 2022-02-09 PROCEDURE — 76536 US SOFT TISSUE HEAD NECK THYROID: ICD-10-PCS | Mod: 26,,, | Performed by: RADIOLOGY

## 2022-02-09 PROCEDURE — 76536 US EXAM OF HEAD AND NECK: CPT | Mod: TC

## 2022-02-10 ENCOUNTER — PATIENT MESSAGE (OUTPATIENT)
Dept: ENDOCRINOLOGY | Facility: CLINIC | Age: 37
End: 2022-02-10
Payer: MEDICAID

## 2022-03-08 ENCOUNTER — LAB VISIT (OUTPATIENT)
Dept: LAB | Facility: HOSPITAL | Age: 37
End: 2022-03-08
Payer: MEDICAID

## 2022-03-08 DIAGNOSIS — E05.90 HYPERTHYROIDISM: ICD-10-CM

## 2022-03-08 LAB
T4 FREE SERPL-MCNC: 0.76 NG/DL (ref 0.71–1.51)
TSH SERPL DL<=0.005 MIU/L-ACNC: 6.85 UIU/ML (ref 0.4–4)

## 2022-03-08 PROCEDURE — 84443 ASSAY THYROID STIM HORMONE: CPT | Performed by: NURSE PRACTITIONER

## 2022-03-08 PROCEDURE — 36415 COLL VENOUS BLD VENIPUNCTURE: CPT | Mod: PO | Performed by: NURSE PRACTITIONER

## 2022-03-08 PROCEDURE — 84439 ASSAY OF FREE THYROXINE: CPT | Performed by: NURSE PRACTITIONER

## 2022-03-09 ENCOUNTER — PATIENT MESSAGE (OUTPATIENT)
Dept: ENDOCRINOLOGY | Facility: CLINIC | Age: 37
End: 2022-03-09
Payer: MEDICAID

## 2022-03-09 DIAGNOSIS — E05.90 HYPERTHYROIDISM: Primary | ICD-10-CM

## 2022-03-09 RX ORDER — METHIMAZOLE 5 MG/1
TABLET ORAL
Qty: 45 TABLET | Refills: 3 | Status: SHIPPED | OUTPATIENT
Start: 2022-03-09 | End: 2022-03-17 | Stop reason: SDUPTHER

## 2022-03-09 NOTE — TELEPHONE ENCOUNTER
Diagnosed with Graves disease 4/2020.     Current Medication:  Start Date 4/2020  Methimazole 10mg daily  2/8/2022 Hold Methimazole for 1 week.  Restart at Methimazole 5mg daily    Lab Results   Component Value Date    TSH 6.848 (H) 03/08/2022    FREET4 0.76 03/08/2022     Recommended Medication Changes:  Hold Methimazole for 1 week.  Restart at Methimazole 2.5mg daily     Labs in 4 weeks.

## 2022-03-17 ENCOUNTER — PATIENT MESSAGE (OUTPATIENT)
Dept: ENDOCRINOLOGY | Facility: CLINIC | Age: 37
End: 2022-03-17
Payer: MEDICAID

## 2022-03-17 DIAGNOSIS — E05.90 HYPERTHYROIDISM: ICD-10-CM

## 2022-03-17 RX ORDER — METHIMAZOLE 5 MG/1
TABLET ORAL
Qty: 45 TABLET | Refills: 3 | Status: SHIPPED | OUTPATIENT
Start: 2022-03-17 | End: 2022-10-03

## 2022-04-06 ENCOUNTER — LAB VISIT (OUTPATIENT)
Dept: LAB | Facility: HOSPITAL | Age: 37
End: 2022-04-06
Attending: NURSE PRACTITIONER
Payer: MEDICAID

## 2022-04-06 DIAGNOSIS — E05.90 HYPERTHYROIDISM: ICD-10-CM

## 2022-04-06 LAB
T4 FREE SERPL-MCNC: 0.73 NG/DL (ref 0.71–1.51)
TSH SERPL DL<=0.005 MIU/L-ACNC: 5.64 UIU/ML (ref 0.4–4)

## 2022-04-06 PROCEDURE — 84443 ASSAY THYROID STIM HORMONE: CPT | Performed by: NURSE PRACTITIONER

## 2022-04-06 PROCEDURE — 84439 ASSAY OF FREE THYROXINE: CPT | Performed by: NURSE PRACTITIONER

## 2022-04-06 PROCEDURE — 36415 COLL VENOUS BLD VENIPUNCTURE: CPT | Mod: PO | Performed by: NURSE PRACTITIONER

## 2022-04-07 ENCOUNTER — TELEPHONE (OUTPATIENT)
Dept: ENDOCRINOLOGY | Facility: CLINIC | Age: 37
End: 2022-04-07
Payer: MEDICAID

## 2022-04-07 ENCOUNTER — PATIENT MESSAGE (OUTPATIENT)
Dept: ENDOCRINOLOGY | Facility: CLINIC | Age: 37
End: 2022-04-07
Payer: MEDICAID

## 2022-04-07 DIAGNOSIS — E05.90 HYPERTHYROIDISM: Primary | ICD-10-CM

## 2022-05-03 ENCOUNTER — LAB VISIT (OUTPATIENT)
Dept: LAB | Facility: HOSPITAL | Age: 37
End: 2022-05-03
Payer: MEDICAID

## 2022-05-03 DIAGNOSIS — E05.90 HYPERTHYROIDISM: ICD-10-CM

## 2022-05-03 LAB — TSH SERPL DL<=0.005 MIU/L-ACNC: 1.06 UIU/ML (ref 0.4–4)

## 2022-05-03 PROCEDURE — 36415 COLL VENOUS BLD VENIPUNCTURE: CPT | Mod: PO | Performed by: NURSE PRACTITIONER

## 2022-05-03 PROCEDURE — 84443 ASSAY THYROID STIM HORMONE: CPT | Performed by: NURSE PRACTITIONER

## 2022-05-04 ENCOUNTER — PATIENT MESSAGE (OUTPATIENT)
Dept: ENDOCRINOLOGY | Facility: CLINIC | Age: 37
End: 2022-05-04
Payer: MEDICAID

## 2022-05-04 DIAGNOSIS — E05.90 HYPERTHYROIDISM: Primary | ICD-10-CM

## 2022-05-04 NOTE — TELEPHONE ENCOUNTER
Diagnosed with Graves disease 4/2020.     Current Medication:  Start Date 4/2020  Methimazole 10mg daily  2/8/2022 Hold Methimazole for 1 week.  Restart at Methimazole 5mg daily  3/9/2022 Hold Methimazole for 1 week.  Restart at Methimazole 2.5mg daily  4/7/2022 Stop Methimazole    Lab Results   Component Value Date    TSH 1.060 05/03/2022    FREET4 0.73 04/06/2022       Labs in 12 weeks.

## 2022-05-27 ENCOUNTER — OFFICE VISIT (OUTPATIENT)
Dept: URGENT CARE | Facility: CLINIC | Age: 37
End: 2022-05-27
Payer: MEDICAID

## 2022-05-27 VITALS
WEIGHT: 230 LBS | HEIGHT: 62 IN | BODY MASS INDEX: 42.33 KG/M2 | SYSTOLIC BLOOD PRESSURE: 114 MMHG | HEART RATE: 63 BPM | DIASTOLIC BLOOD PRESSURE: 77 MMHG | RESPIRATION RATE: 16 BRPM | TEMPERATURE: 99 F | OXYGEN SATURATION: 97 %

## 2022-05-27 DIAGNOSIS — Z20.822 ENCOUNTER FOR LABORATORY TESTING FOR COVID-19 VIRUS: Primary | ICD-10-CM

## 2022-05-27 DIAGNOSIS — R09.81 SINUS CONGESTION: ICD-10-CM

## 2022-05-27 DIAGNOSIS — U07.1 COVID-19: ICD-10-CM

## 2022-05-27 LAB
CTP QC/QA: YES
SARS-COV-2 RDRP RESP QL NAA+PROBE: POSITIVE

## 2022-05-27 PROCEDURE — 99213 OFFICE O/P EST LOW 20 MIN: CPT | Mod: S$GLB,,, | Performed by: PHYSICIAN ASSISTANT

## 2022-05-27 PROCEDURE — 1160F PR REVIEW ALL MEDS BY PRESCRIBER/CLIN PHARMACIST DOCUMENTED: ICD-10-PCS | Mod: CPTII,S$GLB,, | Performed by: PHYSICIAN ASSISTANT

## 2022-05-27 PROCEDURE — 3078F PR MOST RECENT DIASTOLIC BLOOD PRESSURE < 80 MM HG: ICD-10-PCS | Mod: CPTII,S$GLB,, | Performed by: PHYSICIAN ASSISTANT

## 2022-05-27 PROCEDURE — U0002: ICD-10-PCS | Mod: QW,S$GLB,, | Performed by: PHYSICIAN ASSISTANT

## 2022-05-27 PROCEDURE — 1159F PR MEDICATION LIST DOCUMENTED IN MEDICAL RECORD: ICD-10-PCS | Mod: CPTII,S$GLB,, | Performed by: PHYSICIAN ASSISTANT

## 2022-05-27 PROCEDURE — 99213 PR OFFICE/OUTPT VISIT, EST, LEVL III, 20-29 MIN: ICD-10-PCS | Mod: S$GLB,,, | Performed by: PHYSICIAN ASSISTANT

## 2022-05-27 PROCEDURE — 3074F SYST BP LT 130 MM HG: CPT | Mod: CPTII,S$GLB,, | Performed by: PHYSICIAN ASSISTANT

## 2022-05-27 PROCEDURE — 3008F BODY MASS INDEX DOCD: CPT | Mod: CPTII,S$GLB,, | Performed by: PHYSICIAN ASSISTANT

## 2022-05-27 PROCEDURE — 1160F RVW MEDS BY RX/DR IN RCRD: CPT | Mod: CPTII,S$GLB,, | Performed by: PHYSICIAN ASSISTANT

## 2022-05-27 PROCEDURE — 1159F MED LIST DOCD IN RCRD: CPT | Mod: CPTII,S$GLB,, | Performed by: PHYSICIAN ASSISTANT

## 2022-05-27 PROCEDURE — U0002 COVID-19 LAB TEST NON-CDC: HCPCS | Mod: QW,S$GLB,, | Performed by: PHYSICIAN ASSISTANT

## 2022-05-27 PROCEDURE — 3078F DIAST BP <80 MM HG: CPT | Mod: CPTII,S$GLB,, | Performed by: PHYSICIAN ASSISTANT

## 2022-05-27 PROCEDURE — 3008F PR BODY MASS INDEX (BMI) DOCUMENTED: ICD-10-PCS | Mod: CPTII,S$GLB,, | Performed by: PHYSICIAN ASSISTANT

## 2022-05-27 PROCEDURE — 3074F PR MOST RECENT SYSTOLIC BLOOD PRESSURE < 130 MM HG: ICD-10-PCS | Mod: CPTII,S$GLB,, | Performed by: PHYSICIAN ASSISTANT

## 2022-05-27 RX ORDER — FLUTICASONE PROPIONATE 50 MCG
1 SPRAY, SUSPENSION (ML) NASAL DAILY
Qty: 16 G | Refills: 3 | Status: SHIPPED | OUTPATIENT
Start: 2022-05-27

## 2022-05-27 RX ORDER — CROMOLYN SODIUM 5.2 MG/ML
1 SPRAY, METERED NASAL 4 TIMES DAILY
Qty: 26 ML | Refills: 1 | Status: SHIPPED | OUTPATIENT
Start: 2022-05-27 | End: 2023-11-13

## 2022-05-27 NOTE — PROGRESS NOTES
"Subjective:       Patient ID: Savannah Tijerina is a 37 y.o. female.    Vitals:  height is 5' 2" (1.575 m) and weight is 104.3 kg (230 lb). Her temperature is 98.8 °F (37.1 °C). Her blood pressure is 114/77 and her pulse is 63. Her respiration is 16 and oxygen saturation is 97%.     Chief Complaint: Cough    Pt c/o cough, congestion, itchy throat, PND, mild body aches, headache, and nausea x3days. Pt sts she has been taking ibuprofen with some relief. Pt sts she had an at home POS COVID test last night. Pt is COVID vaccinated no booster.     Cough  This is a new problem. The current episode started in the past 7 days. The problem has been unchanged. The problem occurs constantly. The cough is productive of sputum. Associated symptoms include chills, headaches, myalgias, nasal congestion, postnasal drip and a sore throat. Pertinent negatives include no chest pain, ear congestion, ear pain, fever, heartburn, hemoptysis, rash, rhinorrhea, shortness of breath, sweats, weight loss or wheezing. Treatments tried: Ibuprofen. The treatment provided mild relief. There is no history of asthma, bronchiectasis, bronchitis, COPD, emphysema, environmental allergies or pneumonia.       Constitution: Positive for chills. Negative for sweating, fatigue and fever.   HENT: Positive for congestion, postnasal drip, sinus pressure and sore throat. Negative for ear pain.    Cardiovascular: Negative for chest pain.   Respiratory: Positive for cough and sputum production. Negative for bloody sputum, shortness of breath and wheezing.    Gastrointestinal: Positive for nausea. Negative for vomiting, constipation, diarrhea and heartburn.   Musculoskeletal: Positive for muscle ache.   Skin: Negative for rash.   Allergic/Immunologic: Negative for environmental allergies.   Neurological: Positive for headaches.       Objective:      Physical Exam   Constitutional: She is oriented to person, place, and time. She appears well-developed. She is " cooperative.  Non-toxic appearance. She does not appear ill. No distress.   HENT:   Head: Normocephalic and atraumatic.   Ears:   Right Ear: Hearing, tympanic membrane, external ear and ear canal normal.   Left Ear: Hearing, tympanic membrane, external ear and ear canal normal.   Nose: Nose normal. No mucosal edema, rhinorrhea or nasal deformity. No epistaxis. Right sinus exhibits no maxillary sinus tenderness and no frontal sinus tenderness. Left sinus exhibits no maxillary sinus tenderness and no frontal sinus tenderness.   Mouth/Throat: Uvula is midline, oropharynx is clear and moist and mucous membranes are normal. No trismus in the jaw. Normal dentition. No uvula swelling. No oropharyngeal exudate, posterior oropharyngeal edema or posterior oropharyngeal erythema.   Eyes: Conjunctivae and lids are normal. No scleral icterus.   Neck: Trachea normal and phonation normal. Neck supple. No edema present. No erythema present. No neck rigidity present.   Cardiovascular: Normal rate, regular rhythm, normal heart sounds and normal pulses.   Pulmonary/Chest: Effort normal and breath sounds normal. No respiratory distress. She has no decreased breath sounds. She has no rhonchi.   Abdominal: Normal appearance.   Musculoskeletal: Normal range of motion.         General: No deformity. Normal range of motion.   Neurological: She is alert and oriented to person, place, and time. She exhibits normal muscle tone. Coordination normal.   Skin: Skin is warm, dry, intact, not diaphoretic, not pale and no rash. Capillary refill takes less than 2 seconds.   Psychiatric: Her speech is normal and behavior is normal. Judgment and thought content normal.   Nursing note and vitals reviewed.    Results for orders placed or performed in visit on 05/27/22   POCT COVID-19 Rapid Screening   Result Value Ref Range    POC Rapid COVID Positive (A) Negative     Acceptable Yes     No results found.       Assessment:       1. Encounter  for laboratory testing for COVID-19 virus    2. COVID-19    3. Sinus congestion          Plan:         Encounter for laboratory testing for COVID-19 virus  -     POCT COVID-19 Rapid Screening    COVID-19    Sinus congestion  -     cromolyn (NASALCHROM) 5.2 mg/spray (4 %) nasal spray; 1 spray by Nasal route 4 (four) times daily.  Dispense: 26 mL; Refill: 1  -     fluticasone propionate (FLONASE) 50 mcg/actuation nasal spray; 1 spray (50 mcg total) by Each Nostril route once daily.  Dispense: 16 g; Refill: 3    Follow up if symptoms worsen or fail to improve, for F/U with PCP or ED. There are no Patient Instructions on file for this visit.

## 2022-08-04 ENCOUNTER — PATIENT MESSAGE (OUTPATIENT)
Dept: ENDOCRINOLOGY | Facility: CLINIC | Age: 37
End: 2022-08-04
Payer: MEDICAID

## 2022-08-04 ENCOUNTER — LAB VISIT (OUTPATIENT)
Dept: LAB | Facility: HOSPITAL | Age: 37
End: 2022-08-04
Attending: PHYSICIAN ASSISTANT
Payer: MEDICAID

## 2022-08-04 DIAGNOSIS — E05.90 HYPERTHYROIDISM: Primary | ICD-10-CM

## 2022-08-04 DIAGNOSIS — E05.90 HYPERTHYROIDISM: ICD-10-CM

## 2022-08-04 LAB
T4 FREE SERPL-MCNC: 1.23 NG/DL (ref 0.71–1.51)
TSH SERPL DL<=0.005 MIU/L-ACNC: 0.01 UIU/ML (ref 0.4–4)

## 2022-08-04 PROCEDURE — 36415 COLL VENOUS BLD VENIPUNCTURE: CPT | Mod: PO | Performed by: NURSE PRACTITIONER

## 2022-08-04 PROCEDURE — 84439 ASSAY OF FREE THYROXINE: CPT | Performed by: NURSE PRACTITIONER

## 2022-08-04 PROCEDURE — 84443 ASSAY THYROID STIM HORMONE: CPT | Performed by: NURSE PRACTITIONER

## 2022-08-04 NOTE — TELEPHONE ENCOUNTER
Diagnosed with Graves disease 4/2020.     Current Medication:  Start Date 4/2020  Methimazole 10mg daily  2/8/2022 Hold Methimazole for 1 week.  Restart at Methimazole 5mg daily  3/9/2022 Hold Methimazole for 1 week.  Restart at Methimazole 2.5mg daily  4/6/2022 Stop Methimazole    Lab Results   Component Value Date    TSH 0.012 (L) 08/04/2022    FREET4 0.73 04/06/2022

## 2022-09-30 ENCOUNTER — LAB VISIT (OUTPATIENT)
Dept: LAB | Facility: HOSPITAL | Age: 37
End: 2022-09-30
Attending: NURSE PRACTITIONER
Payer: MEDICAID

## 2022-09-30 DIAGNOSIS — E05.90 HYPERTHYROIDISM: ICD-10-CM

## 2022-09-30 LAB
T3 SERPL-MCNC: 167 NG/DL (ref 60–180)
T4 FREE SERPL-MCNC: 1.15 NG/DL (ref 0.71–1.51)
TSH SERPL DL<=0.005 MIU/L-ACNC: <0.01 UIU/ML (ref 0.4–4)

## 2022-09-30 PROCEDURE — 84480 ASSAY TRIIODOTHYRONINE (T3): CPT | Performed by: NURSE PRACTITIONER

## 2022-09-30 PROCEDURE — 84443 ASSAY THYROID STIM HORMONE: CPT | Performed by: NURSE PRACTITIONER

## 2022-09-30 PROCEDURE — 36415 COLL VENOUS BLD VENIPUNCTURE: CPT | Mod: PO | Performed by: NURSE PRACTITIONER

## 2022-09-30 PROCEDURE — 84439 ASSAY OF FREE THYROXINE: CPT | Performed by: NURSE PRACTITIONER

## 2022-09-30 PROCEDURE — 83520 IMMUNOASSAY QUANT NOS NONAB: CPT | Performed by: NURSE PRACTITIONER

## 2022-10-01 LAB — TSH RECEP AB SER-ACNC: 1.88 IU/L (ref 0–1.75)

## 2022-10-03 ENCOUNTER — PATIENT MESSAGE (OUTPATIENT)
Dept: ENDOCRINOLOGY | Facility: CLINIC | Age: 37
End: 2022-10-03
Payer: MEDICAID

## 2022-10-03 DIAGNOSIS — E05.90 HYPERTHYROIDISM: ICD-10-CM

## 2022-10-03 RX ORDER — METHIMAZOLE 5 MG/1
TABLET ORAL
Qty: 45 TABLET | Refills: 3 | Status: SHIPPED | OUTPATIENT
Start: 2022-10-03 | End: 2023-04-09

## 2022-10-03 RX ORDER — METHIMAZOLE 5 MG/1
TABLET ORAL
Qty: 45 TABLET | Refills: 3 | Status: SHIPPED | OUTPATIENT
Start: 2022-10-03 | End: 2022-10-03

## 2022-10-03 NOTE — TELEPHONE ENCOUNTER
Diagnosed with Graves disease 4/2020.     Current Medication:  Start Date 4/2020  Methimazole 10mg daily  2/8/2022 Hold Methimazole for 1 week.  Restart at Methimazole 5mg daily  3/9/2022 Hold Methimazole for 1 week.  Restart at Methimazole 2.5mg daily  4/7/2022 Stop Methimazole    Lab Results   Component Value Date    TSH <0.010 (L) 09/30/2022    [id number] 167 09/30/2022    FREET4 1.15 09/30/2022     Medication:  RESTART Methimazole 2.5mg daily    Labs in 8 weeks.

## 2022-11-30 ENCOUNTER — LAB VISIT (OUTPATIENT)
Dept: LAB | Facility: HOSPITAL | Age: 37
End: 2022-11-30
Payer: MEDICAID

## 2022-11-30 DIAGNOSIS — E05.90 HYPERTHYROIDISM: ICD-10-CM

## 2022-11-30 LAB
ALBUMIN SERPL BCP-MCNC: 3.8 G/DL (ref 3.5–5.2)
ALP SERPL-CCNC: 42 U/L (ref 55–135)
ALT SERPL W/O P-5'-P-CCNC: 13 U/L (ref 10–44)
ANION GAP SERPL CALC-SCNC: 7 MMOL/L (ref 8–16)
AST SERPL-CCNC: 12 U/L (ref 10–40)
BASOPHILS # BLD AUTO: 0.02 K/UL (ref 0–0.2)
BASOPHILS NFR BLD: 0.3 % (ref 0–1.9)
BILIRUB SERPL-MCNC: 0.7 MG/DL (ref 0.1–1)
BUN SERPL-MCNC: 11 MG/DL (ref 6–20)
CALCIUM SERPL-MCNC: 9.1 MG/DL (ref 8.7–10.5)
CHLORIDE SERPL-SCNC: 106 MMOL/L (ref 95–110)
CO2 SERPL-SCNC: 25 MMOL/L (ref 23–29)
CREAT SERPL-MCNC: 0.7 MG/DL (ref 0.5–1.4)
DIFFERENTIAL METHOD: NORMAL
EOSINOPHIL # BLD AUTO: 0.2 K/UL (ref 0–0.5)
EOSINOPHIL NFR BLD: 2.3 % (ref 0–8)
ERYTHROCYTE [DISTWIDTH] IN BLOOD BY AUTOMATED COUNT: 13.4 % (ref 11.5–14.5)
EST. GFR  (NO RACE VARIABLE): >60 ML/MIN/1.73 M^2
GLUCOSE SERPL-MCNC: 94 MG/DL (ref 70–110)
HCT VFR BLD AUTO: 42.9 % (ref 37–48.5)
HGB BLD-MCNC: 14.1 G/DL (ref 12–16)
IMM GRANULOCYTES # BLD AUTO: 0.02 K/UL (ref 0–0.04)
IMM GRANULOCYTES NFR BLD AUTO: 0.3 % (ref 0–0.5)
LYMPHOCYTES # BLD AUTO: 1.9 K/UL (ref 1–4.8)
LYMPHOCYTES NFR BLD: 28.3 % (ref 18–48)
MCH RBC QN AUTO: 28.6 PG (ref 27–31)
MCHC RBC AUTO-ENTMCNC: 32.9 G/DL (ref 32–36)
MCV RBC AUTO: 87 FL (ref 82–98)
MONOCYTES # BLD AUTO: 0.5 K/UL (ref 0.3–1)
MONOCYTES NFR BLD: 6.6 % (ref 4–15)
NEUTROPHILS # BLD AUTO: 4.2 K/UL (ref 1.8–7.7)
NEUTROPHILS NFR BLD: 62.2 % (ref 38–73)
NRBC BLD-RTO: 0 /100 WBC
PLATELET # BLD AUTO: 330 K/UL (ref 150–450)
PMV BLD AUTO: 11.5 FL (ref 9.2–12.9)
POTASSIUM SERPL-SCNC: 4.2 MMOL/L (ref 3.5–5.1)
PROT SERPL-MCNC: 6.8 G/DL (ref 6–8.4)
RBC # BLD AUTO: 4.93 M/UL (ref 4–5.4)
SODIUM SERPL-SCNC: 138 MMOL/L (ref 136–145)
T4 FREE SERPL-MCNC: 0.96 NG/DL (ref 0.71–1.51)
TSH SERPL DL<=0.005 MIU/L-ACNC: <0.01 UIU/ML (ref 0.4–4)
WBC # BLD AUTO: 6.81 K/UL (ref 3.9–12.7)

## 2022-11-30 PROCEDURE — 85025 COMPLETE CBC W/AUTO DIFF WBC: CPT | Performed by: NURSE PRACTITIONER

## 2022-11-30 PROCEDURE — 36415 COLL VENOUS BLD VENIPUNCTURE: CPT | Mod: PO | Performed by: NURSE PRACTITIONER

## 2022-11-30 PROCEDURE — 84443 ASSAY THYROID STIM HORMONE: CPT | Performed by: NURSE PRACTITIONER

## 2022-11-30 PROCEDURE — 84439 ASSAY OF FREE THYROXINE: CPT | Performed by: NURSE PRACTITIONER

## 2022-11-30 PROCEDURE — 80053 COMPREHEN METABOLIC PANEL: CPT | Performed by: NURSE PRACTITIONER

## 2022-12-01 ENCOUNTER — PATIENT MESSAGE (OUTPATIENT)
Dept: ENDOCRINOLOGY | Facility: CLINIC | Age: 37
End: 2022-12-01
Payer: MEDICAID

## 2022-12-01 DIAGNOSIS — E05.90 HYPERTHYROIDISM: Primary | ICD-10-CM

## 2022-12-01 NOTE — TELEPHONE ENCOUNTER
Diagnosed with Graves disease 4/2020.     Current Medication:  Start Date 4/2020  Methimazole 10mg daily  2/8/2022 Hold Methimazole for 1 week.  Restart at Methimazole 5mg daily  3/9/2022 Hold Methimazole for 1 week.  Restart at Methimazole 2.5mg daily  4/7/2022 Stop Methimazole  10/2022 RESTART Methimazole 2.5mg daily    Lab Results   Component Value Date    TSH <0.010 (L) 11/30/2022    [id number] 167 09/30/2022    FREET4 0.96 11/30/2022     No changes.  Labs in 12 weeks.

## 2023-01-18 ENCOUNTER — PATIENT MESSAGE (OUTPATIENT)
Dept: ENDOCRINOLOGY | Facility: CLINIC | Age: 38
End: 2023-01-18
Payer: MEDICAID

## 2023-03-01 ENCOUNTER — LAB VISIT (OUTPATIENT)
Dept: LAB | Facility: HOSPITAL | Age: 38
End: 2023-03-01
Payer: MEDICAID

## 2023-03-01 DIAGNOSIS — E05.90 HYPERTHYROIDISM: ICD-10-CM

## 2023-03-01 LAB
T4 FREE SERPL-MCNC: 0.79 NG/DL (ref 0.71–1.51)
TSH SERPL DL<=0.005 MIU/L-ACNC: 1.37 UIU/ML (ref 0.4–4)

## 2023-03-01 PROCEDURE — 84443 ASSAY THYROID STIM HORMONE: CPT | Performed by: NURSE PRACTITIONER

## 2023-03-01 PROCEDURE — 36415 COLL VENOUS BLD VENIPUNCTURE: CPT | Mod: PO | Performed by: NURSE PRACTITIONER

## 2023-03-01 PROCEDURE — 84439 ASSAY OF FREE THYROXINE: CPT | Performed by: NURSE PRACTITIONER

## 2023-03-02 DIAGNOSIS — E05.90 HYPERTHYROIDISM: Primary | ICD-10-CM

## 2023-03-02 NOTE — PROGRESS NOTES
Latest Reference Range & Units 09/30/22 10:05 11/30/22 09:02 03/01/23 09:10   TSH 0.400 - 4.000 uIU/mL <0.010 (L) <0.010 (L) 1.372   T3, Total 60 - 180 ng/dL 167     Free T4 0.71 - 1.51 ng/dL 1.15 0.96 0.79   Thyrotropin Receptor Ab 0.00 - 1.75 IU/L 1.88 (H)     (L): Data is abnormally low  (H): Data is abnormally high      Current Medication:  Start Date 4/2020  Methimazole 10mg daily  2/8/2022 Hold Methimazole for 1 week.  Restart at Methimazole 5mg daily  3/9/2022 Hold Methimazole for 1 week.  Restart at Methimazole 2.5mg daily  4/7/2022 Stop Methimazole  10/2022 RESTART Methimazole 2.5mg daily    Message sent with below   reviewed your lab results.   Your thyroid level is improving nicely on the methimazole.   You are currently taking   Methimazole 2.5 mg daily    Please change to   Methimazole 2.5 mg every other day   Check TSH in 8 weeks. Someone will be in touch to schedule you.     Ms Hanh Napier presents for evaluation of mediastinal lymphadenopathy with a history of cervical cancer, completed treatment in July 2019  Dr Jamar Reyes is concerned that these lymph nodes could represent metastatic disease  Dr Donna Becerra is recommending flexible bronchoscopy, endobronchial ultrasound with biopsy, possible mediastinoscopy  This was discussed at length  It is an outpatient procedure  Patient does not wish to proceed with mediastinoscopy portion  Dr Donna Becerra discussed risk involved with false negative from EBUS  The only way to know for sure would be mediastinoscopy  Patient is aware and still does not want an incision  EBUS discussed at length, risks and benefits discussed  Consent obtained  All questions addressed

## 2023-03-09 ENCOUNTER — OFFICE VISIT (OUTPATIENT)
Dept: OBSTETRICS AND GYNECOLOGY | Facility: CLINIC | Age: 38
End: 2023-03-09
Attending: OBSTETRICS & GYNECOLOGY
Payer: MEDICAID

## 2023-03-09 VITALS
HEIGHT: 62 IN | DIASTOLIC BLOOD PRESSURE: 78 MMHG | BODY MASS INDEX: 31.24 KG/M2 | SYSTOLIC BLOOD PRESSURE: 110 MMHG | WEIGHT: 169.75 LBS

## 2023-03-09 DIAGNOSIS — N39.46 MIXED INCONTINENCE: Primary | ICD-10-CM

## 2023-03-09 DIAGNOSIS — N81.4 UTERINE PROLAPSE: ICD-10-CM

## 2023-03-09 PROCEDURE — 87086 URINE CULTURE/COLONY COUNT: CPT | Performed by: OBSTETRICS & GYNECOLOGY

## 2023-03-09 PROCEDURE — 3008F PR BODY MASS INDEX (BMI) DOCUMENTED: ICD-10-PCS | Mod: CPTII,,, | Performed by: OBSTETRICS & GYNECOLOGY

## 2023-03-09 PROCEDURE — 99213 PR OFFICE/OUTPT VISIT, EST, LEVL III, 20-29 MIN: ICD-10-PCS | Mod: S$PBB,,, | Performed by: OBSTETRICS & GYNECOLOGY

## 2023-03-09 PROCEDURE — 3078F PR MOST RECENT DIASTOLIC BLOOD PRESSURE < 80 MM HG: ICD-10-PCS | Mod: CPTII,,, | Performed by: OBSTETRICS & GYNECOLOGY

## 2023-03-09 PROCEDURE — 1160F PR REVIEW ALL MEDS BY PRESCRIBER/CLIN PHARMACIST DOCUMENTED: ICD-10-PCS | Mod: CPTII,,, | Performed by: OBSTETRICS & GYNECOLOGY

## 2023-03-09 PROCEDURE — 99213 OFFICE O/P EST LOW 20 MIN: CPT | Mod: S$PBB,,, | Performed by: OBSTETRICS & GYNECOLOGY

## 2023-03-09 PROCEDURE — 3074F SYST BP LT 130 MM HG: CPT | Mod: CPTII,,, | Performed by: OBSTETRICS & GYNECOLOGY

## 2023-03-09 PROCEDURE — 1159F MED LIST DOCD IN RCRD: CPT | Mod: CPTII,,, | Performed by: OBSTETRICS & GYNECOLOGY

## 2023-03-09 PROCEDURE — 3078F DIAST BP <80 MM HG: CPT | Mod: CPTII,,, | Performed by: OBSTETRICS & GYNECOLOGY

## 2023-03-09 PROCEDURE — 99213 OFFICE O/P EST LOW 20 MIN: CPT | Mod: PBBFAC | Performed by: OBSTETRICS & GYNECOLOGY

## 2023-03-09 PROCEDURE — 3074F PR MOST RECENT SYSTOLIC BLOOD PRESSURE < 130 MM HG: ICD-10-PCS | Mod: CPTII,,, | Performed by: OBSTETRICS & GYNECOLOGY

## 2023-03-09 PROCEDURE — 1159F PR MEDICATION LIST DOCUMENTED IN MEDICAL RECORD: ICD-10-PCS | Mod: CPTII,,, | Performed by: OBSTETRICS & GYNECOLOGY

## 2023-03-09 PROCEDURE — 99999 PR PBB SHADOW E&M-EST. PATIENT-LVL III: ICD-10-PCS | Mod: PBBFAC,,, | Performed by: OBSTETRICS & GYNECOLOGY

## 2023-03-09 PROCEDURE — 99999 PR PBB SHADOW E&M-EST. PATIENT-LVL III: CPT | Mod: PBBFAC,,, | Performed by: OBSTETRICS & GYNECOLOGY

## 2023-03-09 PROCEDURE — 3008F BODY MASS INDEX DOCD: CPT | Mod: CPTII,,, | Performed by: OBSTETRICS & GYNECOLOGY

## 2023-03-09 PROCEDURE — 1160F RVW MEDS BY RX/DR IN RCRD: CPT | Mod: CPTII,,, | Performed by: OBSTETRICS & GYNECOLOGY

## 2023-03-09 NOTE — PROGRESS NOTES
HISTORY OF PRESENT ILLNESS:    Savannah Tijerina is a 37 y.o. female, , Patient's last menstrual period was 2023 (exact date).,  presents for a problem visit, complaining of      last visit.   Pap & HPV negative    Episode of coughing about a month ago and thought something came out from her vagina briefly.  Since then has had +urgency and +KELVIN.  Has had to change underclothes.  +urinating frequently.  Does feel like she empties completely  No dysuria or back pain.    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     States she saw a [facility]rogynecologist [facility] but didn't have an exam.  Was given meds for overactive bladder which didn't help.    Past Medical History:   Diagnosis Date    Abnormal Pap smear of cervix     Graves disease     Hypertension     Mitral valve prolapse 2021       Past Surgical History:   Procedure Laterality Date    APPENDECTOMY      TUBAL LIGATION         MEDICATIONS AND ALLERGIES:      Current Outpatient Medications:     atenoloL (TENORMIN) 100 MG tablet, Take 100 mg by mouth., Disp: , Rfl:     ibuprofen 800 mg/200 mL (4 mg/mL) PgBk, , Disp: , Rfl:     methIMAzole (TAPAZOLE) 5 MG Tab, Take 1/2 tablet (2.5mg) daily., Disp: 45 tablet, Rfl: 3    aluminum & magnesium hydroxide-simethicone (MYLANTA MAX STRENGTH) 400-400-40 mg/5 mL suspension, Take 5 mLs by mouth every 6 (six) hours as needed for Indigestion., Disp: 335 mL, Rfl: 0    atenoloL (TENORMIN) 100 MG tablet, Take 100 mg by mouth., Disp: , Rfl:     cromolyn (NASALCHROM) 5.2 mg/spray (4 %) nasal spray, 1 spray by Nasal route 4 (four) times daily. (Patient not taking: Reported on 3/9/2023), Disp: 26 mL, Rfl: 1    famotidine (PEPCID) 20 MG tablet, Take 1 tablet (20 mg total) by mouth 2 (two) times daily., Disp: 20 tablet, Rfl: 0    fluticasone propionate  "(FLONASE) 50 mcg/actuation nasal spray, 1 spray (50 mcg total) by Each Nostril route once daily. (Patient not taking: Reported on 3/9/2023), Disp: 16 g, Rfl: 3    ondansetron (ZOFRAN-ODT) 4 MG TbDL, Take 2 tablets (8 mg total) by mouth every 8 (eight) hours as needed (Nausea). (Patient not taking: Reported on 2/8/2022), Disp: 15 tablet, Rfl: 0    Review of patient's allergies indicates:   Allergen Reactions    Amoxicillin Hives    Penicillins        Family History   Problem Relation Age of Onset    Breast cancer Neg Hx     Colon cancer Neg Hx     Ovarian cancer Neg Hx        Social History     Socioeconomic History    Marital status:    Tobacco Use    Smoking status: Every Day     Packs/day: 1.00     Types: Cigarettes    Smokeless tobacco: Never   Substance and Sexual Activity    Alcohol use: No    Drug use: No    Sexual activity: Yes     Partners: Male     Birth control/protection: Surgical     Comment: tubal       ROS:  GENERAL: No weight changes. No swelling. No fatigue. No fever.  CARDIOVASCULAR: No chest pain. No shortness of breath. No leg cramps.   NEUROLOGICAL: No headaches. No vision changes.  BREASTS: No pain. No lumps. No discharge.  ABDOMEN: No pain. No nausea. No vomiting. No diarrhea. No constipation.  REPRODUCTIVE: No abnormal bleeding.   VULVA: No pain. No lesions. No itching.  VAGINA: No relaxation. No itching. No odor. No discharge. No lesions.  URINARY: No incontinence. No nocturia. No frequency. No dysuria.    /78   Ht 5' 2" (1.575 m)   Wt 77 kg (169 lb 12.1 oz)   LMP 03/08/2023 (Exact Date)   BMI 31.05 kg/m²     PE:  APPEARANCE: Well nourished, well developed, in no acute distress.  ABDOMEN: Soft. No tenderness or masses. No hepatosplenomegaly. No hernias.  BREASTS, FUNDOSCOPIC, RECTAL DEFERRED  PELVIC: External female genitalia without lesions.  Female hair distribution. Adequate perineal body, Normal urethral meatus. Vagina moist and well rugated without lesions or discharge.  " No significant cystocele or rectocele present. Cervix pink without lesions, discharge or tenderness. Uterus is normal size, regular, mobile and nontender. Slight prolapse with valsalva.  Adnexa without masses or tenderness.  EXTREMITIES: No edema      DIAGNOSIS & PLAN  1. Mixed incontinence  Ambulatory referral/consult to Urogynecology    Urine culture    Ambulatory referral/consult to Physical/Occupational Therapy      2. Uterine prolapse  Ambulatory referral/consult to Urogynecology    Ambulatory referral/consult to Physical/Occupational Therapy              COUNSELING:  F/u urine culture.  Ref urogyn and pelvic PT      20 minutes addressing problems.  This includes face to face time and non-face to face time preparing to see the patient (eg, review of tests), Obtaining and/or reviewing separately obtained history, Documenting clinical information in the electronic or other health record, Independently interpreting resultsand communicating results to the patient/family/caregiver, or Care coordination.

## 2023-03-10 LAB — BACTERIA UR CULT: NO GROWTH

## 2023-03-28 ENCOUNTER — OFFICE VISIT (OUTPATIENT)
Dept: UROGYNECOLOGY | Facility: CLINIC | Age: 38
End: 2023-03-28
Attending: OBSTETRICS & GYNECOLOGY
Payer: MEDICAID

## 2023-03-28 VITALS
HEIGHT: 62 IN | BODY MASS INDEX: 32.17 KG/M2 | WEIGHT: 174.81 LBS | DIASTOLIC BLOOD PRESSURE: 78 MMHG | SYSTOLIC BLOOD PRESSURE: 112 MMHG

## 2023-03-28 DIAGNOSIS — R15.9 INCONTINENCE OF FECES, UNSPECIFIED FECAL INCONTINENCE TYPE: ICD-10-CM

## 2023-03-28 DIAGNOSIS — F17.200 SMOKER: ICD-10-CM

## 2023-03-28 DIAGNOSIS — R39.15 URINARY URGENCY: ICD-10-CM

## 2023-03-28 DIAGNOSIS — N39.3 STRESS INCONTINENCE: Primary | ICD-10-CM

## 2023-03-28 LAB
BACTERIA #/AREA URNS AUTO: NORMAL /HPF
BILIRUB UR QL STRIP: NEGATIVE
GLUCOSE UR QL STRIP: NEGATIVE
KETONES UR QL STRIP: NEGATIVE
LEUKOCYTE ESTERASE UR QL STRIP: NEGATIVE
MICROSCOPIC COMMENT: NORMAL
PH, POC UA: 8
POC BLOOD, URINE: POSITIVE
POC NITRATES, URINE: NEGATIVE
POC RESIDUAL URINE VOLUME: 0 ML (ref 0–100)
PROT UR QL STRIP: NEGATIVE
RBC #/AREA URNS AUTO: 1 /HPF (ref 0–4)
SP GR UR STRIP: 1.02 (ref 1–1.03)
SQUAMOUS #/AREA URNS AUTO: 1 /HPF
UROBILINOGEN UR STRIP-ACNC: NORMAL (ref 0.1–1.1)
WBC #/AREA URNS AUTO: 3 /HPF (ref 0–5)

## 2023-03-28 PROCEDURE — 3078F PR MOST RECENT DIASTOLIC BLOOD PRESSURE < 80 MM HG: ICD-10-PCS | Mod: CPTII,,, | Performed by: OBSTETRICS & GYNECOLOGY

## 2023-03-28 PROCEDURE — 99214 OFFICE O/P EST MOD 30 MIN: CPT | Mod: PBBFAC | Performed by: OBSTETRICS & GYNECOLOGY

## 2023-03-28 PROCEDURE — 81001 URINALYSIS AUTO W/SCOPE: CPT | Performed by: OBSTETRICS & GYNECOLOGY

## 2023-03-28 PROCEDURE — 3008F BODY MASS INDEX DOCD: CPT | Mod: CPTII,,, | Performed by: OBSTETRICS & GYNECOLOGY

## 2023-03-28 PROCEDURE — 99215 PR OFFICE/OUTPT VISIT, EST, LEVL V, 40-54 MIN: ICD-10-PCS | Mod: S$PBB,,, | Performed by: OBSTETRICS & GYNECOLOGY

## 2023-03-28 PROCEDURE — 81003 URINALYSIS AUTO W/O SCOPE: CPT | Mod: PBBFAC,59 | Performed by: OBSTETRICS & GYNECOLOGY

## 2023-03-28 PROCEDURE — 3074F SYST BP LT 130 MM HG: CPT | Mod: CPTII,,, | Performed by: OBSTETRICS & GYNECOLOGY

## 2023-03-28 PROCEDURE — 87086 URINE CULTURE/COLONY COUNT: CPT | Performed by: OBSTETRICS & GYNECOLOGY

## 2023-03-28 PROCEDURE — 51798 US URINE CAPACITY MEASURE: CPT | Mod: PBBFAC | Performed by: OBSTETRICS & GYNECOLOGY

## 2023-03-28 PROCEDURE — 1160F RVW MEDS BY RX/DR IN RCRD: CPT | Mod: CPTII,,, | Performed by: OBSTETRICS & GYNECOLOGY

## 2023-03-28 PROCEDURE — 99999 PR PBB SHADOW E&M-EST. PATIENT-LVL IV: CPT | Mod: PBBFAC,,, | Performed by: OBSTETRICS & GYNECOLOGY

## 2023-03-28 PROCEDURE — 1159F MED LIST DOCD IN RCRD: CPT | Mod: CPTII,,, | Performed by: OBSTETRICS & GYNECOLOGY

## 2023-03-28 PROCEDURE — 1159F PR MEDICATION LIST DOCUMENTED IN MEDICAL RECORD: ICD-10-PCS | Mod: CPTII,,, | Performed by: OBSTETRICS & GYNECOLOGY

## 2023-03-28 PROCEDURE — 99999 PR PBB SHADOW E&M-EST. PATIENT-LVL IV: ICD-10-PCS | Mod: PBBFAC,,, | Performed by: OBSTETRICS & GYNECOLOGY

## 2023-03-28 PROCEDURE — 3074F PR MOST RECENT SYSTOLIC BLOOD PRESSURE < 130 MM HG: ICD-10-PCS | Mod: CPTII,,, | Performed by: OBSTETRICS & GYNECOLOGY

## 2023-03-28 PROCEDURE — 1160F PR REVIEW ALL MEDS BY PRESCRIBER/CLIN PHARMACIST DOCUMENTED: ICD-10-PCS | Mod: CPTII,,, | Performed by: OBSTETRICS & GYNECOLOGY

## 2023-03-28 PROCEDURE — 3008F PR BODY MASS INDEX (BMI) DOCUMENTED: ICD-10-PCS | Mod: CPTII,,, | Performed by: OBSTETRICS & GYNECOLOGY

## 2023-03-28 PROCEDURE — 99215 OFFICE O/P EST HI 40 MIN: CPT | Mod: S$PBB,,, | Performed by: OBSTETRICS & GYNECOLOGY

## 2023-03-28 PROCEDURE — 3078F DIAST BP <80 MM HG: CPT | Mod: CPTII,,, | Performed by: OBSTETRICS & GYNECOLOGY

## 2023-03-28 NOTE — PROGRESS NOTES
Chief Complaint   Patient presents with    Urinary Incontinence        HPI: Patient is a 37 y.o. female  who presents today with c/o leakage of urine when she is coughing. She states that symptoms started around 2022. Symptoms are new for her. No significant change in symptoms over the last few months. She reports some urinary urgency and symptoms also started 2022. She reports voiding about once per hour and if she drinks a bottle of water she will then void about two times per hour. She is not waking at night. She denies urgency urinary incontinence. Denies needing to wear a pad for symptoms.   She is drinking water, about 4-5 bottles per day. She may occasionally drink mild and stopped caffeine and any other energy drink. May occasionally have a soda, once every two days. Saw urology at [facility] and was started on Vesicare 5 mg. She took it for one week and had a headache for about one week while taking the medication. She stopped the medication as a result as she wasn't sure if it was causing her HA's. HA's resolved.     She states that also last night when she urinated she passed gas and when she wiped anally she noticed a little yellow gel like discharge. She denies accidental bowel leakage.     She reports that when she has a BM she may occasionally notice a vaginal bulge but internal. Does not happen all the time. Denies constipation.     Review of Systems   Constitutional:  Negative for activity change, appetite change, chills, fatigue, fever and unexpected weight change.   HENT:  Negative for nasal congestion, dental problem, hearing loss, mouth dryness and sore throat.    Eyes:  Negative for pain and eye dryness.   Respiratory:  Negative for cough, shortness of breath and wheezing.    Cardiovascular:  Negative for chest pain, palpitations and leg swelling.   Gastrointestinal:  Negative for abdominal distention, abdominal pain, blood in stool, constipation and rectal pain.   Endocrine: Negative  for cold intolerance and heat intolerance.   Genitourinary:  Negative for dyspareunia, hot flashes and vaginal dryness.   Musculoskeletal:  Negative for arthralgias, back pain, gait problem, joint swelling, myalgias, neck pain and neck stiffness.   Integumentary:  Negative for rash.   Neurological:  Negative for dizziness, tremors, seizures, speech difficulty, weakness, light-headedness, numbness and headaches.   Psychiatric/Behavioral:  Negative for confusion, dysphoric mood and sleep disturbance. The patient is not nervous/anxious.       Past Medical History:   Diagnosis Date    Abnormal Pap smear of cervix     Graves disease     Hypertension     Mitral valve prolapse 04/2021       Past Surgical History:   Procedure Laterality Date    APPENDECTOMY      TONSILLECTOMY      TUBAL LIGATION           Current Outpatient Medications:     atenoloL (TENORMIN) 100 MG tablet, Take 100 mg by mouth., Disp: , Rfl:     methIMAzole (TAPAZOLE) 5 MG Tab, Take 1/2 tablet (2.5mg) daily., Disp: 45 tablet, Rfl: 3    aluminum & magnesium hydroxide-simethicone (MYLANTA MAX STRENGTH) 400-400-40 mg/5 mL suspension, Take 5 mLs by mouth every 6 (six) hours as needed for Indigestion., Disp: 335 mL, Rfl: 0    atenoloL (TENORMIN) 100 MG tablet, Take 100 mg by mouth., Disp: , Rfl:     cromolyn (NASALCHROM) 5.2 mg/spray (4 %) nasal spray, 1 spray by Nasal route 4 (four) times daily. (Patient not taking: Reported on 3/9/2023), Disp: 26 mL, Rfl: 1    famotidine (PEPCID) 20 MG tablet, Take 1 tablet (20 mg total) by mouth 2 (two) times daily., Disp: 20 tablet, Rfl: 0    fluticasone propionate (FLONASE) 50 mcg/actuation nasal spray, 1 spray (50 mcg total) by Each Nostril route once daily. (Patient not taking: Reported on 3/9/2023), Disp: 16 g, Rfl: 3    ibuprofen 800 mg/200 mL (4 mg/mL) PgBk, , Disp: , Rfl:     ondansetron (ZOFRAN-ODT) 4 MG TbDL, Take 2 tablets (8 mg total) by mouth every 8 (eight) hours as needed (Nausea). (Patient not taking:  Reported on 2022), Disp: 15 tablet, Rfl: 0    Review of patient's allergies indicates:   Allergen Reactions    Amoxicillin Hives    Penicillins        Family History   Problem Relation Age of Onset    Hypertension Mother     Other Mother         Vaginal prolapse    Diabetes Father     Breast cancer Neg Hx     Colon cancer Neg Hx     Ovarian cancer Neg Hx        Social History     Socioeconomic History    Marital status:    Tobacco Use    Smoking status: Every Day     Packs/day: 0.50     Years: 22.00     Pack years: 11.00     Types: Cigarettes    Smokeless tobacco: Never    Tobacco comments:     Will be speaking with her cardiologist.   Substance and Sexual Activity    Alcohol use: Yes     Comment: occasional    Drug use: Yes     Types: Marijuana    Sexual activity: Yes     Partners: Male     Birth control/protection: Surgical     Comment: tubal   Social History Narrative    Lives with spouse, two daughters and one son. Feels safe in her home.        OB History          2    Para   2    Term   2            AB        Living   3         SAB        IAB        Ectopic        Multiple   1    Live Births                     Gyn History    Mammogram: n/a  Pap smear: 20 negative  LMP: Patient's last menstrual period was 2023 (exact date).   Postmenopausal bleeding: n/a      INITIAL PHYSICAL EXAMINATION    Vitals:    23 0909   BP: 112/78      General: Healthy in appearance, Well nourished, Affect Normal, NAD.  Neck: Supple, No masses, Trachea midline, Thyroid normal size,  non-tender, no masses or nodules.  Nodes: No clavicular/cervical adenopathy.  Skin: Normal temperature, No atypical lesions or rash.  Heart: Normal sounds, no murmurs  Lungs: Normal respiratory effort.  Gastrointestinal: Non tender, Non distended, No masses, guarding or  rebound, No hepatosplenomegally, No hernia.  Ext: No clubbing, cyanosis, edema or varicosities.  DTR's: 2+ bilaterally  Strength 5/5 bilateral  upper and lower extremities    Genitourinary-  Vulva: normal without lesions, masses, atrophy or pain  Urethra: meatus central and normal in appearance, no prolapse/caruncle, no masses or discharge. Empty supine stress test was negative.  Bladder: non-tender, no masses  Vagina: No discharge or lesions, no atrophy, no masses appreciated.  [See POP-Q]  Cervix: no lesions, no discharge  Uterus:  non-tender, anteverted, approximately 6 weeks size  Adnexa: no masses, non tender.  Rectal: deferred   Neuro: S2-4- pin prick and light touch intact, Anal wink present  Levator strength: 0/5  Levator tenderness: left 0/10 and right 0/10    POP-Q Exam- pelvic organ prolapse quantitative    Aa -2  Anterior Wall Ba -2  Anterior wall C -6.5  Cervix or cuff   Gh 4.5  Genital hiatus pb 3  perineal body tvl 10  Total vaginal length   Ap -2  Posterior wall Bp -2  Posterior wall D -9.5  Posterior fornix     with Uterus    POP-Q Stage:1      Office Visit on 03/28/2023   Component Date Value Ref Range Status    POC Residual Urine Volume 03/28/2023 0  0 - 100 mL Final    POC Blood, Urine 03/28/2023 Positive (A)  Negative Final    POC Bilirubin, Urine 03/28/2023 Negative  Negative Final    POC Urobilinogen, Urine 03/28/2023 Normal  0.1 - 1.1 Final    POC Ketones, Urine 03/28/2023 Negative  Negative Final    POC Protein, Urine 03/28/2023 Negative  Negative Final    POC Nitrates, Urine 03/28/2023 Negative  Negative Final    POC Glucose, Urine 03/28/2023 Negative  Negative Final    pH, UA 03/28/2023 8   Final    POC Specific Gravity, Urine 03/28/2023 1.020  1.003 - 1.029 Final    POC Leukocytes, Urine 03/28/2023 Negative  Negative Final          ASSESSMENT & PLAN:    Stress incontinence  -     Ambulatory referral/consult to Urogynecology  -     POCT Bladder Scan  -     POCT Urinalysis, Dipstick, Automated, W/O Scope  -     Ambulatory referral/consult to Physical/Occupational Therapy; Future; Expected date: 04/04/2023    Urinary urgency  -      Ambulatory referral/consult to Urogynecology  -     Ambulatory referral/consult to Physical/Occupational Therapy; Future; Expected date: 04/04/2023  -     Urinalysis Microscopic  -     Urine culture    Incontinence of feces, unspecified fecal incontinence type  Comments:  Occasoinal mucous leakage    Smoker       Exam findings and treatment options were discussed in detail with the patient. Her symptoms are consistent with stress urinary incontinence and urinary urgency. We spent time in discussion today of the differences between symptoms. We reviewed treatment options of , discussing in detail that for the urinary urgency she can try conservative measures such as bladder retraining, PT or medications and for KELVIN options such as PT, Impressa Poise, pessary use or surgery. Handouts on bladder training, fluid titration, and stress urinary incontinence were provided.   Patient opts to start with conservative management. She will start with PFPT, bladder training/ fluid titration. Reviewed that should we pursue surgery especially a midurehtral sling I would advise smoking cessation. Counseled about smoking cessation. She plans to discuss further with her cardiologist with whom she has an upcoming appointment as they have discussed this previously. Reviewed that she was noted to have some blood in the urine and if microscopy shows the presence of a significnat amount of cells then I would recommend further testing with CT Urogram and Cystoscopy.   For her bowel symptoms since stool consistency is loose have advised that she increase fiber in her diet to further bulk the stool.       All questions were answered today. The patient was encouraged to contact the office as needed with any additional questions or concerns.     Total time spent on visit was 40 minutes.  This includes face to face time and non-face to face time preparing to see the patient (eg, review of tests), Obtaining and/or reviewing separately obtained  history, Documenting clinical information in the electronic or other health record, Independently interpreting resultsand communicating results to the patient/family/caregiver, or Care coordination.    Nicole Jay MD

## 2023-03-29 LAB
BACTERIA UR CULT: NORMAL
BACTERIA UR CULT: NORMAL

## 2023-04-26 ENCOUNTER — CLINICAL SUPPORT (OUTPATIENT)
Dept: REHABILITATION | Facility: HOSPITAL | Age: 38
End: 2023-04-26
Attending: OBSTETRICS & GYNECOLOGY
Payer: MEDICAID

## 2023-04-26 DIAGNOSIS — R39.15 URINARY URGENCY: ICD-10-CM

## 2023-04-26 DIAGNOSIS — N81.89 PELVIC FLOOR WEAKNESS: Primary | ICD-10-CM

## 2023-04-26 DIAGNOSIS — R27.9 LACK OF COORDINATION: ICD-10-CM

## 2023-04-26 DIAGNOSIS — N39.3 STRESS INCONTINENCE: ICD-10-CM

## 2023-04-26 PROCEDURE — 97162 PT EVAL MOD COMPLEX 30 MIN: CPT | Mod: PN

## 2023-04-26 PROCEDURE — 97530 THERAPEUTIC ACTIVITIES: CPT | Mod: PN

## 2023-04-26 NOTE — PROGRESS NOTES
[facility]   Pelvic Health Physical Therapy Initial Evaluation     Date: 4/26/2023   Name: Savannah Tijerina  Clinic Number: [id number]    Therapy Diagnosis:   Encounter Diagnoses   Name Primary?    Stress incontinence     Urinary urgency     Pelvic floor weakness Yes    Lack of coordination      Physician: Nicole Jay MD    Physician Orders: PT Eval and Treat   Medical Diagnosis from Referral: stress incontinence and urinary urgency  Evaluation Date: 4/26/2023  Authorization Period Expiration: 5/26/2023  Plan of Care Expiration: 7/26/2023  Progress Note Due: 5/26/2023  Visit # / Visits authorized: 1/ 1   FOTO: Issued Visit #: 1     Precautions: Standard    Time In: 11:08  Time Out: 11:53  Total Appointment Time (timed & untimed codes): 45 minutes    SUBJECTIVE     Date of onset: for the last 4-5 months    History of current condition - Savannah reports: she is having difficulty controlling bladder, especially with coughing.     OB/GYN History:  2 pregnancies and 3 children one was twins (pregnancies were 11 months). All vaginal deliveries. Mild tearing with first and did need forceps.   Sexually active? No  Pain with vaginal exams, intercourse or tampon use? No    Bladder/Bowel History:   Frequency of urination:   Daytime: 2-3 times per hour           Nighttime: 0   Difficulty initiating urine stream: No  Urine stream: strong  Complete emptying: No, feels like she has to go back.   Bladder leakage: Yes  Frequency of incidents: at least once per day  Amount leaked (urine): few drops and teaspoon(s)  Urinary Urgency: No, Able to delay the urge for at least 60 minute(s).  Frequency of bowel movements: 1-2 times a day, second time is soft  Difficulty initiating BM: No  Quality/Shape of BM:  soft solid  Does Patient Feel Empty after BM? Yes  Fiber Supplements or Laxative Use? No. Says thyroid medication will stimulate BM.   Colon leakage: No  Frequency of incidents: none   Amount leaked  "(bowels):  none  Form of protection: panty liner  Number of pads required in 24 hours: 0-1    Pain:  Location: some left side pain when bladder is full. Low back pain  Current 0/10, worst 6/10, best 0/10   Description: Shooting and Dull  Aggravating Factors/Activities that cause symptoms: Full bladder   Easing Factors:  emptying bladder    Imaging none     Prior Therapy: none  Social History: lives with their family  Current exercise: walking  Occupation: Patient works as a .  Prior Level of Function: independent  Current Level of Function: independent    Types of fluid intake: water 5-6 bottles, soda maybe once per day, energy drink every few days, glass of milk daily  Diet: reports she is a picky eater. Frozen pizza, fries, mac n cheese, fried foods   Abuse/Neglect: No     Patients goals: "to stop the bladder leakage, to figure out what going on with the bladder"      Medical History: Savannah  has a past medical history of Abnormal Pap smear of cervix, Graves disease, Hypertension, and Mitral valve prolapse (04/2021).     Surgical History: Savannah Tijerina  has a past surgical history that includes Tubal ligation; Appendectomy; and Tonsillectomy.    Medications: Savannah has a current medication list which includes the following prescription(s): aluminum & magnesium hydroxide-simethicone, atenolol, atenolol, cromolyn, famotidine, fluticasone propionate, ibuprofen, methimazole, and ondansetron.    Allergies:   Review of patient's allergies indicates:   Allergen Reactions    Amoxicillin Hives    Penicillins         OBJECTIVE     See EMR under MEDIA for written consent provided 4/26/2023  Chaperone: declined    ORTHO SCREEN  Posture in sitting:  sits selvin cross  Posture in standing: forward and rounded shoulders , posterior pelvic tilt , and elevated iliac crest on the right  Pelvic alignment:  right leg appears shorter in supine    SI Joint Palpation: Denies tenderness to SI joint palpation " bilaterally.  Sacral spring test: NT (Positive=NO spring)  Adductor Palpation: increased tension     ABDOMINAL WALL ASSESSMENT  Palpation: tender and increased tension to left lateral abdomen  Abdominal strength: Rectus abdominus: 3-/5     Transverse abdominus: 3-/5  Scarring: n/a  Pelvic Floor Muscle and Transverse Abdominus Synergy: absent  Diastasis: absent      BREATHING MECHANICS ASSESSMENT   Thorax Assessment During Quiet Respiration: Decreased excursion of abdominal wall   Thorax Assessment During Deep Respiration: Decreased excursion of abdominal wall , Decreased excursion bilaterally of lateral ribs , and Excessive excursion of sternum       Limitation/Restriction for FOTO Survey    Therapist reviewed FOTO scores for Savannah Tijerina on 4/26/2023.   FOTO documents entered into EPIC - see Media section.    Limitation Score:   PFDI Prolapse 0%  Urinary Problem 56%  PFDI Urinary 0%       TREATMENT     Total Treatment time (time-based codes) separate from Evaluation: 25 minutes     Therapeutic Activity to improve dynamic functional performance for 25 minutes including:  Diaphragmatic breathing  Bladder scheduling    PATIENT EDUCATION AND HOME EXERCISES     Education provided:   general anatomy/physiology of urinary/ bowel  system and benefits of treatment were discussed with the patient. Additionally, anatomy/physiology of pelvic floor, posture/body mechanices, bladder retraining, diaphragmatic breathing, double voiding techniques, fluid intake/dietary modifications, and behavior modifications were reviewed.     Written Home Exercises provided: yes.  Exercises were reviewed and Savannah was able to demonstrate them prior to the end of the session. Savannah demonstrated good  understanding of the education provided. See EMR under Patient Instructions for exercises provided during therapy sessions.    ASSESSMENT     Savannah is a 37 y.o. female referred to outpatient Physical Therapy with a medical diagnosis of  stress incontinence and urinary urgency. Pt presents with pelvic floor weakness, increased tone, and decreased coordination.     Patient prognosis is Good.   Patient will benefit from skilled outpatient Physical Therapy to address the deficits stated above and in the chart below, provide patient/family education, and to maximize patient's level of independence.     Plan of care discussed with patient: Yes  Patient's spiritual, cultural and educational needs considered and patient is agreeable to the plan of care and goals as stated below:     Anticipated Barriers for therapy: scheduling    Medical Necessity is demonstrated by the following:  History  Co-morbidities and personal factors that may impact the plan of care Co-morbidities   HTN and Graves disease    Personal Factors  no deficits     moderate   Examination  Body structures and functions, activity limitations and participation restrictions that may impact the plan of care Body Regions/Systems/Functions:  altered posture, poor knowledge of body mechanics and posture, decreased pelvic muscle strength, decreased endurance of the pelvic muscles, decreased phasic ability of the pelvic muscles, increased tension of the pelvic muscles, poor quality of pelvic muscle contraction, poor coordination of pelvic floor muscles during ADL's leading to urinary or fecal leakage, poor fluid intake, poor diet, incomplete urination, and dysfunctional voiding     Activity Limitations:  urgency , delaying urge to urinate, pain with full bladder affecting ADL participation and/or sleep, full bladder emptying, and incontinence with ADLs    Participation Restrictions:  all ADLs/iADLs uninterrupted by urinary incontinence/urgency/frequency, ADLs affected by inability to fully empty bladder , ADL participation affected by pain, and exercise restrictions due to incontinence     Activity limitations:   Learning and applying knowledge  no deficits    General Tasks and Commands  no  "deficits    Communication  no deficits    Mobility  no deficits    Self care  no deficits    Domestic Life  no deficits    Interactions/Relationships  no deficits    Life Areas  no deficits    Community and Social Life  no deficits       moderate   Clinical Presentation evolving clinical presentation with changing clinical characteristics moderate   Decision Making/ Complexity Score: moderate     Goals:  Short Term Goals: 6 weeks   - Patient to be educated on pelvic muscle bracing and be able to consistently perform correctly and quickly to help decrease incontinence with cough/laugh/sneeze.  - Patient to perform "the knack" prior to coughing, laughing or sneezing to decrease risk of incontinence.  - Patient to demonstrate being able to correctly and consistently perform a kegel which is needed to increase pelvic floor muscle coordination and strength needed for continence.  - Patient to report a decrease in urinary frequency from every 30 minutes to no more than once every 1 hours to improve ability to participate in social activities.        Long Term Goals: 12 weeks  - Patient to be discharged with home plan for carry over after discharge.    - Patient to be able to perform a 8 second kegel x 10 reps to demonstrate improving strength and endurance needed for continence.  - Patient to report a decrease in pad usage to 0 pads a day to demonstrate improving pelvic floor muscle controls as evidenced by decreased episodes of incontinence needed to improve confidence in social situations.  - Patient to no longer need to wear a pad for protection due to reduction of urinary incontinence by at least 80% with ADLs.  - Patient to report a decrease in urinary frequency from every 30 minutes to no more than once every 2-4 hours to improve ability to participate in social activities.  - Patient to report elimination of incontinence with ADLs to demonstrate improved pelvic floor muscle strength and coordination.      PLAN "     Plan of care Certification: 4/26/2023 to 7/26/2023.    Outpatient Physical Therapy 1-2 time(s) every 1 week(s) for 8-12 weeks to include the following interventions: Manual Therapy, Moist Heat/ Ice, Neuromuscular Re-ed, Patient Education, Therapeutic Activities, and Therapeutic Exercise.     Jennifer Anne, PT      I CERTIFY THE NEED FOR THESE SERVICES FURNISHED UNDER THIS PLAN OF TREATMENT AND WHILE UNDER MY CARE   Physician's comments:     Physician's Signature: ___________________________________________________

## 2023-04-27 ENCOUNTER — LAB VISIT (OUTPATIENT)
Dept: LAB | Facility: HOSPITAL | Age: 38
End: 2023-04-27
Attending: NURSE PRACTITIONER
Payer: MEDICAID

## 2023-04-27 DIAGNOSIS — E05.90 HYPERTHYROIDISM: ICD-10-CM

## 2023-04-27 PROBLEM — R27.9 LACK OF COORDINATION: Status: ACTIVE | Noted: 2023-04-27

## 2023-04-27 PROBLEM — N81.89 PELVIC FLOOR WEAKNESS: Status: ACTIVE | Noted: 2023-04-27

## 2023-04-27 PROBLEM — R39.15 URINARY URGENCY: Status: ACTIVE | Noted: 2023-04-27

## 2023-04-27 PROBLEM — N39.3 STRESS INCONTINENCE: Status: ACTIVE | Noted: 2023-04-27

## 2023-04-27 LAB
T4 FREE SERPL-MCNC: 0.87 NG/DL (ref 0.71–1.51)
TSH SERPL DL<=0.005 MIU/L-ACNC: 0.03 UIU/ML (ref 0.4–4)

## 2023-04-27 PROCEDURE — 84443 ASSAY THYROID STIM HORMONE: CPT | Performed by: NURSE PRACTITIONER

## 2023-04-27 PROCEDURE — 36415 COLL VENOUS BLD VENIPUNCTURE: CPT | Mod: PO | Performed by: NURSE PRACTITIONER

## 2023-04-27 PROCEDURE — 84439 ASSAY OF FREE THYROXINE: CPT | Performed by: NURSE PRACTITIONER

## 2023-04-27 NOTE — PLAN OF CARE
[facility]   Pelvic Health Physical Therapy Initial Evaluation     Date: 4/26/2023   Name: Savannah Tijerina  Clinic Number: [id number]    Therapy Diagnosis:   Encounter Diagnoses   Name Primary?    Stress incontinence     Urinary urgency     Pelvic floor weakness Yes    Lack of coordination      Physician: Nicole Jay MD    Physician Orders: PT Eval and Treat   Medical Diagnosis from Referral: stress incontinence and urinary urgency  Evaluation Date: 4/26/2023  Authorization Period Expiration: 5/26/2023  Plan of Care Expiration: 7/26/2023  Progress Note Due: 5/26/2023  Visit # / Visits authorized: 1/ 1   FOTO: Issued Visit #: 1     Precautions: Standard    Time In: 11:08  Time Out: 11:53  Total Appointment Time (timed & untimed codes): 45 minutes    SUBJECTIVE     Date of onset: for the last 4-5 months    History of current condition - Savannah reports: she is having difficulty controlling bladder, especially with coughing.     OB/GYN History:  2 pregnancies and 3 children one was twins (pregnancies were 11 months). All vaginal deliveries. Mild tearing with first and did need forceps.   Sexually active? No  Pain with vaginal exams, intercourse or tampon use? No    Bladder/Bowel History:   Frequency of urination:   Daytime: 2-3 times per hour           Nighttime: 0   Difficulty initiating urine stream: No  Urine stream: strong  Complete emptying: No, feels like she has to go back.   Bladder leakage: Yes  Frequency of incidents: at least once per day  Amount leaked (urine): few drops and teaspoon(s)  Urinary Urgency: No, Able to delay the urge for at least 60 minute(s).  Frequency of bowel movements: 1-2 times a day, second time is soft  Difficulty initiating BM: No  Quality/Shape of BM: soft solid  Does Patient Feel Empty after BM? Yes  Fiber Supplements or Laxative Use? No. Says thyroid medication will stimulate BM.   Colon leakage: No  Frequency of incidents: none   Amount leaked  "(bowels): none  Form of protection: panty liner  Number of pads required in 24 hours: 0-1    Pain:  Location: some left side pain when bladder is full. Low back pain  Current 0/10, worst 6/10, best 0/10   Description: Shooting and Dull  Aggravating Factors/Activities that cause symptoms: Full bladder   Easing Factors: emptying bladder    Imaging none     Prior Therapy: none  Social History: lives with their family  Current exercise: walking  Occupation: Patient works as a .  Prior Level of Function: independent  Current Level of Function: independent    Types of fluid intake: water 5-6 bottles, soda maybe once per day, energy drink every few days, glass of milk daily  Diet: reports she is a picky eater. Frozen pizza, fries, mac n cheese, fried foods   Abuse/Neglect: No     Patients goals: "to stop the bladder leakage, to figure out what going on with the bladder"      Medical History: Savannah  has a past medical history of Abnormal Pap smear of cervix, Graves disease, Hypertension, and Mitral valve prolapse (04/2021).     Surgical History: Savannah Tijerina  has a past surgical history that includes Tubal ligation; Appendectomy; and Tonsillectomy.    Medications: Savannah has a current medication list which includes the following prescription(s): aluminum & magnesium hydroxide-simethicone, atenolol, atenolol, cromolyn, famotidine, fluticasone propionate, ibuprofen, methimazole, and ondansetron.    Allergies:   Review of patient's allergies indicates:   Allergen Reactions    Amoxicillin Hives    Penicillins         OBJECTIVE     See EMR under MEDIA for written consent provided 4/26/2023  Chaperone: declined    ORTHO SCREEN  Posture in sitting: sits selvin cross  Posture in standing: forward and rounded shoulders , posterior pelvic tilt , and elevated iliac crest on the right  Pelvic alignment: right leg appears shorter in supine    SI Joint Palpation: Denies tenderness to SI joint palpation " bilaterally.  Sacral spring test: NT (Positive=NO spring)  Adductor Palpation: increased tension     ABDOMINAL WALL ASSESSMENT  Palpation: tender and increased tension to left lateral abdomen  Abdominal strength: Rectus abdominus: 3-/5     Transverse abdominus: 3-/5  Scarring: n/a  Pelvic Floor Muscle and Transverse Abdominus Synergy: absent  Diastasis: absent      BREATHING MECHANICS ASSESSMENT   Thorax Assessment During Quiet Respiration: Decreased excursion of abdominal wall   Thorax Assessment During Deep Respiration: Decreased excursion of abdominal wall , Decreased excursion bilaterally of lateral ribs , and Excessive excursion of sternum       Limitation/Restriction for FOTO Survey    Therapist reviewed FOTO scores for Savannah Tijerina on 4/26/2023.   FOTO documents entered into EPIC - see Media section.    Limitation Score:   PFDI Prolapse 0%  Urinary Problem 56%  PFDI Urinary 0%       TREATMENT     Total Treatment time (time-based codes) separate from Evaluation: 25 minutes     Therapeutic Activity to improve dynamic functional performance for 25 minutes including:  Diaphragmatic breathing  Bladder scheduling    PATIENT EDUCATION AND HOME EXERCISES     Education provided:   general anatomy/physiology of urinary/ bowel  system and benefits of treatment were discussed with the patient. Additionally, anatomy/physiology of pelvic floor, posture/body mechanices, bladder retraining, diaphragmatic breathing, double voiding techniques, fluid intake/dietary modifications, and behavior modifications were reviewed.     Written Home Exercises provided: yes.  Exercises were reviewed and Savannah was able to demonstrate them prior to the end of the session. Savannah demonstrated good  understanding of the education provided. See EMR under Patient Instructions for exercises provided during therapy sessions.    ASSESSMENT     Savannah is a 37 y.o. female referred to outpatient Physical Therapy with a medical diagnosis of  stress incontinence and urinary urgency. Pt presents with pelvic floor weakness, increased tone, and decreased coordination.     Patient prognosis is Good.   Patient will benefit from skilled outpatient Physical Therapy to address the deficits stated above and in the chart below, provide patient/family education, and to maximize patient's level of independence.     Plan of care discussed with patient: Yes  Patient's spiritual, cultural and educational needs considered and patient is agreeable to the plan of care and goals as stated below:     Anticipated Barriers for therapy: scheduling    Medical Necessity is demonstrated by the following:  History  Co-morbidities and personal factors that may impact the plan of care Co-morbidities   HTN and Graves disease    Personal Factors  no deficits     moderate   Examination  Body structures and functions, activity limitations and participation restrictions that may impact the plan of care Body Regions/Systems/Functions:  altered posture, poor knowledge of body mechanics and posture, decreased pelvic muscle strength, decreased endurance of the pelvic muscles, decreased phasic ability of the pelvic muscles, increased tension of the pelvic muscles, poor quality of pelvic muscle contraction, poor coordination of pelvic floor muscles during ADL's leading to urinary or fecal leakage, poor fluid intake, poor diet, incomplete urination, and dysfunctional voiding     Activity Limitations:  urgency , delaying urge to urinate, pain with full bladder affecting ADL participation and/or sleep, full bladder emptying, and incontinence with ADLs    Participation Restrictions:  all ADLs/iADLs uninterrupted by urinary incontinence/urgency/frequency, ADLs affected by inability to fully empty bladder , ADL participation affected by pain, and exercise restrictions due to incontinence     Activity limitations:   Learning and applying knowledge  no deficits    General Tasks and Commands  no  "deficits    Communication  no deficits    Mobility  no deficits    Self care  no deficits    Domestic Life  no deficits    Interactions/Relationships  no deficits    Life Areas  no deficits    Community and Social Life  no deficits       moderate   Clinical Presentation evolving clinical presentation with changing clinical characteristics moderate   Decision Making/ Complexity Score: moderate     Goals:  Short Term Goals: 6 weeks   - Patient to be educated on pelvic muscle bracing and be able to consistently perform correctly and quickly to help decrease incontinence with cough/laugh/sneeze.  - Patient to perform "the knack" prior to coughing, laughing or sneezing to decrease risk of incontinence.  - Patient to demonstrate being able to correctly and consistently perform a kegel which is needed to increase pelvic floor muscle coordination and strength needed for continence.  - Patient to report a decrease in urinary frequency from every 30 minutes to no more than once every 1 hours to improve ability to participate in social activities.        Long Term Goals: 12 weeks  - Patient to be discharged with home plan for carry over after discharge.    - Patient to be able to perform a 8 second kegel x 10 reps to demonstrate improving strength and endurance needed for continence.  - Patient to report a decrease in pad usage to 0 pads a day to demonstrate improving pelvic floor muscle controls as evidenced by decreased episodes of incontinence needed to improve confidence in social situations.  - Patient to no longer need to wear a pad for protection due to reduction of urinary incontinence by at least 80% with ADLs.  - Patient to report a decrease in urinary frequency from every 30 minutes to no more than once every 2-4 hours to improve ability to participate in social activities.  - Patient to report elimination of incontinence with ADLs to demonstrate improved pelvic floor muscle strength and coordination.      PLAN "     Plan of care Certification: 4/26/2023 to 7/26/2023.    Outpatient Physical Therapy 1-2 time(s) every 1 week(s) for 8-12 weeks to include the following interventions: Manual Therapy, Moist Heat/ Ice, Neuromuscular Re-ed, Patient Education, Therapeutic Activities, and Therapeutic Exercise.     Jennifer Anne, PT      I CERTIFY THE NEED FOR THESE SERVICES FURNISHED UNDER THIS PLAN OF TREATMENT AND WHILE UNDER MY CARE   Physician's comments:     Physician's Signature: ___________________________________________________

## 2023-04-27 NOTE — PATIENT INSTRUCTIONS
Home Exercise Program: 04/27/2023    DIAPHRAGMATIC BREATHING     The diaphragm is a dome shaped muscle that forms the floor of the rib cage. It is the most efficient muscle for breathing and relaxation, although most people are not used to using the diaphragm. Diaphragmatic or belly breathing is an important technique to learn because it helps settle down or relax the autonomic nervous system. The correct use of diaphragmatic breathing can help to quiet brain activity resulting in the relaxation of all the muscles and organs of the body. This is accomplished by slow rhythmic breathing concentrated in the diaphragm muscle rather than the chest.    How to do proper relaxation breathing:    Start by lying on your back or reclining in a chair in a relaxed position. Place one hand on your chest and the other on your abdomen.  Relax your jaw by placing your tongue on the floor of your mouth and keeping your teeth slightly apart.   Take a deep breath in, letting the abdomen expand and rise while you keep your upper chest, neck and shoulders relaxed.   As you breathe out, allow your abdomen and chest to fall. Exhale completely.  It doesn't matter if you breathe in/out through your nose and/or mouth. Do whichever feels comfortable.  Remember to breathe slowly.  Do not force your breathing. Do not hold your breath.  Repeat for 5 minutes every day.      BLADDER RETRAINING    The goal of bladder retraining is to return you to a more normal and convenient pattern of urinating. People who experience urinary urgency, frequency, excessive nighttime urinating and urinary leakage can show improvement with this retraining technique. Bladder retraining helps restore your bladder capacity to normal. The program includes education about bladder function, urge control, record keeping, and following a schedule of urinating (voluntarily emptying your bladder). The success of the program depends on your effort to consistently keep a specific  schedule and to have follow-up appointments with your health care provider.    THE RETRAINING PROGRAM  Each morning upon arising, go to the toilet and completely empty your bladder.  Your voiding schedule will begin upon getting out of bed in the morning and end at when going to sleep at bedtime.   Your voiding schedule is every 1 hours.  Follow this interval as closely as possible. The important part of the retraining is that you practice telling your bladder when to empty and when to hold.  Go to the toilet at the scheduled time even if you do not feel the need to urinate. The amount you urinate is not important. It is important to relax and not strain or push while voiding.  If you feel the need to urinate before the scheduled time, use urge delay techniques.  If you have to interrupt the schedule, get back on schedule at the assigned time for the next void.      PROGRESSING THE PROGRAM  The goal is to go 3-4 hours between urinating.  You will change the time between urination by minute/hour intervals.

## 2023-05-01 ENCOUNTER — PATIENT MESSAGE (OUTPATIENT)
Dept: ENDOCRINOLOGY | Facility: CLINIC | Age: 38
End: 2023-05-01
Payer: MEDICAID

## 2023-05-01 DIAGNOSIS — E05.90 HYPERTHYROIDISM: Primary | ICD-10-CM

## 2023-05-01 NOTE — TELEPHONE ENCOUNTER
Lab Results   Component Value Date    TSH 0.028 (L) 04/27/2023    U0LVPTT 167 09/30/2022    FREET4 0.87 04/27/2023

## 2023-05-19 ENCOUNTER — CLINICAL SUPPORT (OUTPATIENT)
Dept: REHABILITATION | Facility: HOSPITAL | Age: 38
End: 2023-05-19
Attending: OBSTETRICS & GYNECOLOGY
Payer: MEDICAID

## 2023-05-19 DIAGNOSIS — N81.89 PELVIC FLOOR WEAKNESS: ICD-10-CM

## 2023-05-19 DIAGNOSIS — R39.15 URINARY URGENCY: Primary | ICD-10-CM

## 2023-05-19 DIAGNOSIS — N39.3 STRESS INCONTINENCE: ICD-10-CM

## 2023-05-19 DIAGNOSIS — R27.9 LACK OF COORDINATION: ICD-10-CM

## 2023-05-19 PROCEDURE — 97112 NEUROMUSCULAR REEDUCATION: CPT | Mod: PN

## 2023-05-19 NOTE — PROGRESS NOTES
Pelvic Health Physical Therapy   Treatment Note     Name: Savannah Tijerina  Clinic Number: [id number]    Therapy Diagnosis:   Encounter Diagnoses   Name Primary?    Urinary urgency Yes    Stress incontinence     Pelvic floor weakness     Lack of coordination      Physician: Nicole Jay MD    Visit Date: 5/19/2023    Physician Orders: Physical Therapy evaluate and treat  Medical Diagnosis: stress incontinence and urinary urgency  Evaluation Date: 4/26/2023  Authorization Period Expiration: 5/26/2023  Plan of Care Certification Period: 7/26/2023  Visit #/Visits authorized: 1/ 12 (+eval)       Time In: 9:02  Time Out: 9:35  Total Billable Time: 32 minutes    Precautions: Standard    Subjective     Pt reports: has been trying to keep bladder schedule but finds it hard to keep the 1 hour at work. Leakage has been about the same. Has not been having as much urgency. Reports she is voiding less frequently during the day. Reports that she is now only having leakage with coughing. Is not having the left side pain as often with bladder fullness. Had a harder time with doing diaphragmatic breathing at home.     She was compliant with home exercise program.  Response to previous treatment: good  Functional change: decreased urinary urgency    Pain: 0/10    Objective     INTERNAL ASSESSMENT  Pain: tender areas noted as follows: periurethral left side, icshiocavernosus. Pain with kegel.  Sensation: WNL  Vaginal vault: stenotic   Muscle Bulk: hypertonus +2  Muscle Power: 2/5   Quality of contraction: slow relaxation and decreased hold  Specificity: patient contracts: deeper layers first   Coordination: tends to hold breath during PFM contration   Prolapse check:redundant tissue noted grade 2      Treatment     Savannah participated in neuromuscular re-education activities to develop Coordination, Control, and Down training for 32 minutes including: diaphragmatic breathing with Kegel and diaphragmatic breathing  In  "supine        Home Exercises Provided and Patient Education Provided     Education provided:   - bladder irritants, anatomy/physiology of pelvic floor, bladder retraining, diaphragmatic breathing, kegels, and behavior modifications  Discussed progression of plan of care with patient; educated pt in activity modification; reviewed HEP with pt. Pt demonstrated and verbalized understanding of all instruction and was provided with a handout of HEP (see Patient Instructions).  - 1.5 hour bladder schedule increased from 1 hour  - kegels x30   - breathing 3 minutes    Written Home Exercises Provided: yes.  Exercises were reviewed and Savannah was able to demonstrate them prior to the end of the session.  Savannah demonstrated good  understanding of the education provided.     See EMR under Patient Instructions for exercises provided prior visit.    Assessment     Patient tolerated treatment well. Demonstrated mild coordination control with performing kegel, improved with verbal cues. Good coordination of diaphragmatic breathing.     Savannah Is progressing well towards her goals.   Pt prognosis is Good.     Pt will continue to benefit from skilled outpatient physical therapy to address the deficits listed in the problem list box on initial evaluation, provide pt/family education and to maximize pt's level of independence in the home and community environment.     Pt's spiritual, cultural and educational needs considered and pt agreeable to plan of care and goals.     Anticipated barriers to physical therapy: scheduling    Goals:  Short Term Goals: 6 weeks   - Patient to be educated on pelvic muscle bracing and be able to consistently perform correctly and quickly to help decrease incontinence with cough/laugh/sneeze.  - Patient to perform "the knack" prior to coughing, laughing or sneezing to decrease risk of incontinence.  - Patient to demonstrate being able to correctly and consistently perform a kegel which is needed to " increase pelvic floor muscle coordination and strength needed for continence.  - Patient to report a decrease in urinary frequency from every 30 minutes to no more than once every 1 hours to improve ability to participate in social activities.                Long Term Goals: 12 weeks  - Patient to be discharged with home plan for carry over after discharge.    - Patient to be able to perform a 8 second kegel x 10 reps to demonstrate improving strength and endurance needed for continence.  - Patient to report a decrease in pad usage to 0 pads a day to demonstrate improving pelvic floor muscle controls as evidenced by decreased episodes of incontinence needed to improve confidence in social situations.  - Patient to no longer need to wear a pad for protection due to reduction of urinary incontinence by at least 80% with ADLs.  - Patient to report a decrease in urinary frequency from every 30 minutes to no more than once every 2-4 hours to improve ability to participate in social activities.  - Patient to report elimination of incontinence with ADLs to demonstrate improved pelvic floor muscle strength and coordination.    Plan     Plan to continue current plan of care and progress as tolerated.     Jennifer Anne, PT

## 2023-06-01 ENCOUNTER — CLINICAL SUPPORT (OUTPATIENT)
Dept: REHABILITATION | Facility: HOSPITAL | Age: 38
End: 2023-06-01
Attending: OBSTETRICS & GYNECOLOGY
Payer: MEDICAID

## 2023-06-01 DIAGNOSIS — N39.3 STRESS INCONTINENCE: ICD-10-CM

## 2023-06-01 DIAGNOSIS — R39.15 URINARY URGENCY: Primary | ICD-10-CM

## 2023-06-01 DIAGNOSIS — N81.89 PELVIC FLOOR WEAKNESS: ICD-10-CM

## 2023-06-01 DIAGNOSIS — R27.9 LACK OF COORDINATION: ICD-10-CM

## 2023-06-01 PROCEDURE — 97112 NEUROMUSCULAR REEDUCATION: CPT | Mod: PN

## 2023-06-01 NOTE — PROGRESS NOTES
Pelvic Health Physical Therapy   Treatment Note     Name: Savannah Tijerina  Clinic Number: [id number]    Therapy Diagnosis:   Encounter Diagnoses   Name Primary?    Urinary urgency Yes    Stress incontinence     Pelvic floor weakness     Lack of coordination      Physician: Nicole Jay MD    Visit Date: 6/1/2023    Physician Orders: Physical Therapy evaluate and treat  Medical Diagnosis: stress incontinence and urinary urgency  Evaluation Date: 4/26/2023  Authorization Period Expiration: 5/26/2023  Plan of Care Certification Period: 7/26/2023  Visit #/Visits authorized: 2/ 12 (+eval)       Time In: 1:02  Time Out: 1:45  Total Billable Time: 43 minutes    Precautions: Standard    Subjective     Pt reports: the 1.5 hour bladder schedule felt like it was too long sometimes and would go on the hour. Had 1-2 instances of leakage with coughing since last session. Had the left side abdominal pain 1-2 times since last session at work after holding bladder for a few hours. At work will need to void every 2 hours at most due to being busy. Is voiding about every 1 hour to 1hr30 minutes. Is not voiding multiple times per hour anymore. Feels like she has improved 40% since starting therapy. Wants more control of pelvic floor with hard coughs, wants to feel like she is not going to leak. Amount of leakage with coughing will vary from a few drops to a moderate amount (enough to change underwear). Was partially compliant with HEP. No adverse reactions after last session.       She was compliant with home exercise program.  Response to previous treatment: good  Functional change: decreased urinary urgency    Pain: 0/10    Objective     INTERNAL ASSESSMENT  Pain: tender areas noted as follows: periurethral left side, icshiocavernosus. Pain with kegel.  Sensation: WNL  Vaginal vault: stenotic   Muscle Bulk: hypertonus +2  Muscle Power: 2/5   Quality of contraction: slow relaxation and decreased hold  Specificity: patient  contracts: deeper layers first   Coordination: tends to hold breath during PFM contration   Prolapse check:redundant tissue noted grade 2      Treatment     Savannah participated in neuromuscular re-education activities to develop Coordination, Control, and Down training for 43 minutes including: diaphragmatic breathing with Kegel and diaphragmatic breathing, kegel elevators, and kegel knacks  In supine and sitting with towel roll to improve proprioception        Home Exercises Provided and Patient Education Provided     Education provided:   - bladder irritants, anatomy/physiology of pelvic floor, bladder retraining, diaphragmatic breathing, kegels, and behavior modifications  Discussed progression of plan of care with patient; educated pt in activity modification; reviewed HEP with pt. Pt demonstrated and verbalized understanding of all instruction and was provided with a handout of HEP (see Patient Instructions).  + follow natural bladder schedule but try not to hold too long  + kegels x30; x10 regular, x10 elevators, x10 knacks  - breathing 3 minutes    Written Home Exercises Provided: yes.  Exercises were reviewed and Savannah was able to demonstrate them prior to the end of the session.  Savannah demonstrated good  understanding of the education provided.     See EMR under Patient Instructions for exercises provided prior visit.    Assessment     Patient tolerated treatment well. Demonstrated mild coordination control with performing kegel, improved with verbal cues.     Savannah Is progressing well towards her goals.   Pt prognosis is Good.     Pt will continue to benefit from skilled outpatient physical therapy to address the deficits listed in the problem list box on initial evaluation, provide pt/family education and to maximize pt's level of independence in the home and community environment.     Pt's spiritual, cultural and educational needs considered and pt agreeable to plan of care and goals.     Anticipated  "barriers to physical therapy: scheduling    Goals:  Short Term Goals: 6 weeks   - Patient to be educated on pelvic muscle bracing and be able to consistently perform correctly and quickly to help decrease incontinence with cough/laugh/sneeze.  - Patient to perform "the knack" prior to coughing, laughing or sneezing to decrease risk of incontinence.  - Patient to demonstrate being able to correctly and consistently perform a kegel which is needed to increase pelvic floor muscle coordination and strength needed for continence.  - Patient to report a decrease in urinary frequency from every 30 minutes to no more than once every 1 hours to improve ability to participate in social activities.                Long Term Goals: 12 weeks  - Patient to be discharged with home plan for carry over after discharge.    - Patient to be able to perform a 8 second kegel x 10 reps to demonstrate improving strength and endurance needed for continence.  - Patient to report a decrease in pad usage to 0 pads a day to demonstrate improving pelvic floor muscle controls as evidenced by decreased episodes of incontinence needed to improve confidence in social situations.  - Patient to no longer need to wear a pad for protection due to reduction of urinary incontinence by at least 80% with ADLs.  - Patient to report a decrease in urinary frequency from every 30 minutes to no more than once every 2-4 hours to improve ability to participate in social activities.  - Patient to report elimination of incontinence with ADLs to demonstrate improved pelvic floor muscle strength and coordination.    Plan     Plan to continue current plan of care and progress as tolerated.     Jennifer Anne, PT         "

## 2023-06-12 ENCOUNTER — LAB VISIT (OUTPATIENT)
Dept: LAB | Facility: HOSPITAL | Age: 38
End: 2023-06-12
Attending: NURSE PRACTITIONER
Payer: MEDICAID

## 2023-06-12 DIAGNOSIS — E05.90 HYPERTHYROIDISM: ICD-10-CM

## 2023-06-12 LAB
T4 FREE SERPL-MCNC: 0.87 NG/DL (ref 0.71–1.51)
TSH SERPL DL<=0.005 MIU/L-ACNC: 1.18 UIU/ML (ref 0.4–4)

## 2023-06-12 PROCEDURE — 84443 ASSAY THYROID STIM HORMONE: CPT | Performed by: NURSE PRACTITIONER

## 2023-06-12 PROCEDURE — 36415 COLL VENOUS BLD VENIPUNCTURE: CPT | Mod: PO | Performed by: NURSE PRACTITIONER

## 2023-06-12 PROCEDURE — 84439 ASSAY OF FREE THYROXINE: CPT | Performed by: NURSE PRACTITIONER

## 2023-06-13 ENCOUNTER — TELEPHONE (OUTPATIENT)
Dept: ENDOCRINOLOGY | Facility: CLINIC | Age: 38
End: 2023-06-13
Payer: MEDICAID

## 2023-08-16 ENCOUNTER — DOCUMENTATION ONLY (OUTPATIENT)
Dept: REHABILITATION | Facility: HOSPITAL | Age: 38
End: 2023-08-16

## 2023-08-16 DIAGNOSIS — R39.15 URINARY URGENCY: Primary | ICD-10-CM

## 2023-08-16 DIAGNOSIS — R27.9 LACK OF COORDINATION: ICD-10-CM

## 2023-08-16 DIAGNOSIS — N39.3 STRESS INCONTINENCE: ICD-10-CM

## 2023-08-16 DIAGNOSIS — N81.89 PELVIC FLOOR WEAKNESS: ICD-10-CM

## 2023-08-16 NOTE — PROGRESS NOTES
"    Outpatient Therapy Discharge Summary     Name: Savannah Tijerina  [facility] Number: [id number]    Therapy Diagnosis:   Encounter Diagnoses   Name Primary?    Urinary urgency Yes    Stress incontinence     Pelvic floor weakness     Lack of coordination      Physician: Nicole Jay MD    Physician Orders: Physical Therapy eval and treat  Medical Diagnosis: stress incontinence and urinary urgency  Evaluation Date: 4/26/2023      Date of Last visit: 6/1/2023  Total Visits Received: 3  Cancelled Visits: 6  No Show Visits: 0    Assessment    Goals:   Short Term Goals: 6 weeks   - Patient to be educated on pelvic muscle bracing and be able to consistently perform correctly and quickly to help decrease incontinence with cough/laugh/sneeze.  - Patient to perform "the knack" prior to coughing, laughing or sneezing to decrease risk of incontinence.  - Patient to demonstrate being able to correctly and consistently perform a kegel which is needed to increase pelvic floor muscle coordination and strength needed for continence.  - Patient to report a decrease in urinary frequency from every 30 minutes to no more than once every 1 hours to improve ability to participate in social activities.                Long Term Goals: 12 weeks  - Patient to be discharged with home plan for carry over after discharge.    - Patient to be able to perform a 8 second kegel x 10 reps to demonstrate improving strength and endurance needed for continence.  - Patient to report a decrease in pad usage to 0 pads a day to demonstrate improving pelvic floor muscle controls as evidenced by decreased episodes of incontinence needed to improve confidence in social situations.  - Patient to no longer need to wear a pad for protection due to reduction of urinary incontinence by at least 80% with ADLs.  - Patient to report a decrease in urinary frequency from every 30 minutes to no more than once every 2-4 hours to improve ability to participate in social " activities.  - Patient to report elimination of incontinence with ADLs to demonstrate improved pelvic floor muscle strength and coordination.      Discharge reason: Patient has not attended therapy since 6/1/2023.    Plan   This patient is discharged from Physical Therapy

## 2023-09-05 ENCOUNTER — PATIENT MESSAGE (OUTPATIENT)
Dept: ENDOCRINOLOGY | Facility: CLINIC | Age: 38
End: 2023-09-05
Payer: COMMERCIAL

## 2023-09-25 ENCOUNTER — PATIENT MESSAGE (OUTPATIENT)
Dept: ENDOCRINOLOGY | Facility: CLINIC | Age: 38
End: 2023-09-25
Payer: COMMERCIAL

## 2023-09-25 DIAGNOSIS — E05.90 HYPERTHYROIDISM: Primary | ICD-10-CM

## 2023-11-13 ENCOUNTER — OFFICE VISIT (OUTPATIENT)
Dept: ENDOCRINOLOGY | Facility: CLINIC | Age: 38
End: 2023-11-13
Payer: COMMERCIAL

## 2023-11-13 DIAGNOSIS — E05.90 HYPERTHYROIDISM: Primary | ICD-10-CM

## 2023-11-13 PROCEDURE — 1160F PR REVIEW ALL MEDS BY PRESCRIBER/CLIN PHARMACIST DOCUMENTED: ICD-10-PCS | Mod: CPTII,95,, | Performed by: NURSE PRACTITIONER

## 2023-11-13 PROCEDURE — 1159F PR MEDICATION LIST DOCUMENTED IN MEDICAL RECORD: ICD-10-PCS | Mod: CPTII,95,, | Performed by: NURSE PRACTITIONER

## 2023-11-13 PROCEDURE — 99214 PR OFFICE/OUTPT VISIT, EST, LEVL IV, 30-39 MIN: ICD-10-PCS | Mod: 95,,, | Performed by: NURSE PRACTITIONER

## 2023-11-13 PROCEDURE — 99214 OFFICE O/P EST MOD 30 MIN: CPT | Mod: 95,,, | Performed by: NURSE PRACTITIONER

## 2023-11-13 PROCEDURE — 1160F RVW MEDS BY RX/DR IN RCRD: CPT | Mod: CPTII,95,, | Performed by: NURSE PRACTITIONER

## 2023-11-13 PROCEDURE — 1159F MED LIST DOCD IN RCRD: CPT | Mod: CPTII,95,, | Performed by: NURSE PRACTITIONER

## 2023-11-13 NOTE — Clinical Note
Labs and thyroid US now RTC 6 months Orders Placed This Encounter     US Soft Tissue Head Neck Thyroid     Comprehensive Metabolic Panel     TSH     T4, Free     Thyrotropin Receptor Antibody     Ambulatory referral/consult to Ophthalmology

## 2023-11-13 NOTE — ASSESSMENT & PLAN NOTE
-- Diagnosed with Graves disease 4/2020.  -- Reviewed possible options of methimazole, I131 therapy and surgery.  -- Graves' disease:   Extensive counseling today regarding the diagnosis of Graves' disease, the various options for treatment including thionamide, surgery, or I-131 therapy. We discussed the pros and cons of each treatment option, including contraindications to ALMEIDA if considering pregnancy in next 6-12 months. Reviewed that with methimazole, the course is ~18 months of treatment and then evaluate for remission. Surgery is most definitive, I-131 takes 2-6 months. Would need lifelong thyroid hormone after surgery or I-131 Rx.   She is NOT considering pregnancy at this point but we reviewed that if this changes or if she becomes pregnant she must call office immediately because of the rare but established teratogenicity of MMI   Reviewed extra-systemic manifestations of Graves' including orbitopathy. Reviewed that cigarette smoking is a known risk factor for thyroid associated orbitopathy and advised to be watchful and avoid smoke.   -- Thionamide monitoring: I have reviewed the risk of agranulocytosis that can occur in 1 of 500 patients who are taking either PTU or methimazole. I have also reviewed the even rarer risk of hepatic dysfunction. A baseline CBC with differential and Liver function tests are available. The instruction sheet was given to the patient that describes these risks, warning symptoms, and instructions to stop the medication, contact the covering endocrinology fellow, and check blood tests.   -- Labs now.  -- Schedule thyroid US.  -- Discussed treatment options - patient prefers to continue with ATD therapy.  -- Continue:  Start Date 4/2020  Methimazole 2.5 mg every other day

## 2023-11-22 ENCOUNTER — LAB VISIT (OUTPATIENT)
Dept: LAB | Facility: HOSPITAL | Age: 38
End: 2023-11-22
Payer: COMMERCIAL

## 2023-11-22 DIAGNOSIS — E05.90 HYPERTHYROIDISM: ICD-10-CM

## 2023-11-22 LAB
ALBUMIN SERPL BCP-MCNC: 3.9 G/DL (ref 3.5–5.2)
ALP SERPL-CCNC: 45 U/L (ref 55–135)
ALT SERPL W/O P-5'-P-CCNC: 21 U/L (ref 10–44)
ANION GAP SERPL CALC-SCNC: 7 MMOL/L (ref 8–16)
AST SERPL-CCNC: 22 U/L (ref 10–40)
BILIRUB SERPL-MCNC: 0.5 MG/DL (ref 0.1–1)
BUN SERPL-MCNC: 10 MG/DL (ref 6–20)
CALCIUM SERPL-MCNC: 9.2 MG/DL (ref 8.7–10.5)
CHLORIDE SERPL-SCNC: 105 MMOL/L (ref 95–110)
CO2 SERPL-SCNC: 25 MMOL/L (ref 23–29)
CREAT SERPL-MCNC: 0.8 MG/DL (ref 0.5–1.4)
EST. GFR  (NO RACE VARIABLE): >60 ML/MIN/1.73 M^2
GLUCOSE SERPL-MCNC: 93 MG/DL (ref 70–110)
POTASSIUM SERPL-SCNC: 4.1 MMOL/L (ref 3.5–5.1)
PROT SERPL-MCNC: 7.4 G/DL (ref 6–8.4)
SODIUM SERPL-SCNC: 137 MMOL/L (ref 136–145)
T4 FREE SERPL-MCNC: 0.89 NG/DL (ref 0.71–1.51)
TSH SERPL DL<=0.005 MIU/L-ACNC: 1.25 UIU/ML (ref 0.4–4)

## 2023-11-22 PROCEDURE — 83520 IMMUNOASSAY QUANT NOS NONAB: CPT | Performed by: NURSE PRACTITIONER

## 2023-11-22 PROCEDURE — 80053 COMPREHEN METABOLIC PANEL: CPT | Performed by: NURSE PRACTITIONER

## 2023-11-22 PROCEDURE — 84443 ASSAY THYROID STIM HORMONE: CPT | Performed by: NURSE PRACTITIONER

## 2023-11-22 PROCEDURE — 84439 ASSAY OF FREE THYROXINE: CPT | Performed by: NURSE PRACTITIONER

## 2023-11-25 LAB — TSH RECEP AB SER-ACNC: <1.1 IU/L (ref 0–1.75)

## 2023-12-11 ENCOUNTER — HOSPITAL ENCOUNTER (OUTPATIENT)
Dept: RADIOLOGY | Facility: HOSPITAL | Age: 38
Discharge: HOME | End: 2023-12-11
Attending: NURSE PRACTITIONER
Payer: COMMERCIAL

## 2023-12-11 DIAGNOSIS — E05.90 HYPERTHYROIDISM: ICD-10-CM

## 2023-12-11 PROCEDURE — 76536 US EXAM OF HEAD AND NECK: CPT | Mod: TC

## 2023-12-11 PROCEDURE — 76536 US EXAM OF HEAD AND NECK: CPT | Mod: 26,,, | Performed by: STUDENT IN AN ORGANIZED HEALTH CARE EDUCATION/TRAINING PROGRAM

## 2023-12-11 PROCEDURE — 76536 US SOFT TISSUE HEAD NECK THYROID: ICD-10-PCS | Mod: 26,,, | Performed by: STUDENT IN AN ORGANIZED HEALTH CARE EDUCATION/TRAINING PROGRAM

## 2024-05-10 DIAGNOSIS — E05.90 HYPERTHYROIDISM: ICD-10-CM

## 2024-05-13 RX ORDER — METHIMAZOLE 5 MG/1
TABLET ORAL
Qty: 45 TABLET | Refills: 3 | Status: SHIPPED | OUTPATIENT
Start: 2024-05-13